# Patient Record
Sex: FEMALE | Race: WHITE | NOT HISPANIC OR LATINO | Employment: OTHER | URBAN - METROPOLITAN AREA
[De-identification: names, ages, dates, MRNs, and addresses within clinical notes are randomized per-mention and may not be internally consistent; named-entity substitution may affect disease eponyms.]

---

## 2019-10-30 ENCOUNTER — APPOINTMENT (OUTPATIENT)
Dept: PREADMISSION TESTING | Facility: HOSPITAL | Age: 74
End: 2019-10-30

## 2019-10-30 VITALS — BODY MASS INDEX: 29.7 KG/M2 | WEIGHT: 161.38 LBS | HEIGHT: 62 IN

## 2019-10-30 LAB
ANION GAP SERPL CALCULATED.3IONS-SCNC: 12 MMOL/L (ref 5–15)
BACTERIA UR QL AUTO: ABNORMAL /HPF
BILIRUB UR QL STRIP: NEGATIVE
BUN BLD-MCNC: 14 MG/DL (ref 8–23)
BUN/CREAT SERPL: 11.5 (ref 7–25)
CALCIUM SPEC-SCNC: 9.4 MG/DL (ref 8.6–10.5)
CEA SERPL-MCNC: 1.66 NG/ML
CHLORIDE SERPL-SCNC: 107 MMOL/L (ref 98–107)
CLARITY UR: ABNORMAL
CO2 SERPL-SCNC: 24 MMOL/L (ref 22–29)
COLOR UR: YELLOW
CREAT BLD-MCNC: 1.22 MG/DL (ref 0.57–1)
DEPRECATED RDW RBC AUTO: 47.8 FL (ref 37–54)
ERYTHROCYTE [DISTWIDTH] IN BLOOD BY AUTOMATED COUNT: 13.3 % (ref 12.3–15.4)
GFR SERPL CREATININE-BSD FRML MDRD: 43 ML/MIN/1.73
GLUCOSE BLD-MCNC: 90 MG/DL (ref 65–99)
GLUCOSE UR STRIP-MCNC: NEGATIVE MG/DL
HBA1C MFR BLD: 5.6 % (ref 4.8–5.6)
HCT VFR BLD AUTO: 35 % (ref 34–46.6)
HGB BLD-MCNC: 10.9 G/DL (ref 12–15.9)
HGB UR QL STRIP.AUTO: ABNORMAL
HYALINE CASTS UR QL AUTO: ABNORMAL /LPF
KETONES UR QL STRIP: NEGATIVE
LEUKOCYTE ESTERASE UR QL STRIP.AUTO: ABNORMAL
MCH RBC QN AUTO: 30.2 PG (ref 26.6–33)
MCHC RBC AUTO-ENTMCNC: 31.1 G/DL (ref 31.5–35.7)
MCV RBC AUTO: 97 FL (ref 79–97)
NITRITE UR QL STRIP: NEGATIVE
PH UR STRIP.AUTO: 5.5 [PH] (ref 5–8)
PLATELET # BLD AUTO: 293 10*3/MM3 (ref 140–450)
PMV BLD AUTO: 10 FL (ref 6–12)
POTASSIUM BLD-SCNC: 4.2 MMOL/L (ref 3.5–5.2)
PROT UR QL STRIP: NEGATIVE
RBC # BLD AUTO: 3.61 10*6/MM3 (ref 3.77–5.28)
RBC # UR: ABNORMAL /HPF
REF LAB TEST METHOD: ABNORMAL
SODIUM BLD-SCNC: 143 MMOL/L (ref 136–145)
SP GR UR STRIP: 1.01 (ref 1–1.03)
SQUAMOUS #/AREA URNS HPF: ABNORMAL /HPF
UROBILINOGEN UR QL STRIP: ABNORMAL
WBC NRBC COR # BLD: 6.54 10*3/MM3 (ref 3.4–10.8)
WBC UR QL AUTO: ABNORMAL /HPF

## 2019-10-30 PROCEDURE — 36415 COLL VENOUS BLD VENIPUNCTURE: CPT

## 2019-10-30 PROCEDURE — 80048 BASIC METABOLIC PNL TOTAL CA: CPT | Performed by: COLON & RECTAL SURGERY

## 2019-10-30 PROCEDURE — 93005 ELECTROCARDIOGRAM TRACING: CPT

## 2019-10-30 PROCEDURE — 85027 COMPLETE CBC AUTOMATED: CPT | Performed by: COLON & RECTAL SURGERY

## 2019-10-30 PROCEDURE — 81001 URINALYSIS AUTO W/SCOPE: CPT | Performed by: COLON & RECTAL SURGERY

## 2019-10-30 PROCEDURE — 87086 URINE CULTURE/COLONY COUNT: CPT | Performed by: COLON & RECTAL SURGERY

## 2019-10-30 PROCEDURE — 83036 HEMOGLOBIN GLYCOSYLATED A1C: CPT | Performed by: COLON & RECTAL SURGERY

## 2019-10-30 PROCEDURE — 82378 CARCINOEMBRYONIC ANTIGEN: CPT | Performed by: COLON & RECTAL SURGERY

## 2019-10-30 PROCEDURE — 93010 ELECTROCARDIOGRAM REPORT: CPT | Performed by: INTERNAL MEDICINE

## 2019-10-30 RX ORDER — GABAPENTIN 300 MG/1
600 CAPSULE ORAL ONCE
Status: SHIPPED | OUTPATIENT
Start: 2019-10-30

## 2019-10-30 RX ORDER — ACETAMINOPHEN 500 MG
1000 TABLET ORAL ONCE
Status: SHIPPED | OUTPATIENT
Start: 2019-10-30

## 2019-10-30 RX ORDER — SCOLOPAMINE TRANSDERMAL SYSTEM 1 MG/1
1 PATCH, EXTENDED RELEASE TRANSDERMAL CONTINUOUS
Status: SHIPPED | OUTPATIENT
Start: 2019-10-30 | End: 2019-11-02

## 2019-10-30 RX ORDER — ALLOPURINOL 300 MG/1
150 TABLET ORAL DAILY
COMMUNITY

## 2019-10-30 RX ORDER — FERROUS SULFATE TAB EC 324 MG (65 MG FE EQUIVALENT) 324 (65 FE) MG
324 TABLET DELAYED RESPONSE ORAL
COMMUNITY
End: 2020-05-28

## 2019-10-30 RX ORDER — SIMVASTATIN 20 MG
20 TABLET ORAL NIGHTLY
COMMUNITY
End: 2022-01-28 | Stop reason: DRUGHIGH

## 2019-10-30 RX ORDER — ASPIRIN 81 MG/1
81 TABLET, CHEWABLE ORAL 2 TIMES DAILY
COMMUNITY
End: 2019-11-09 | Stop reason: HOSPADM

## 2019-10-30 RX ORDER — DOCUSATE SODIUM 100 MG/1
100 CAPSULE, LIQUID FILLED ORAL 2 TIMES DAILY
COMMUNITY

## 2019-10-30 RX ORDER — MELOXICAM 15 MG/1
15 TABLET ORAL ONCE
Status: SHIPPED | OUTPATIENT
Start: 2019-10-30

## 2019-10-31 LAB — BACTERIA SPEC AEROBE CULT: ABNORMAL

## 2019-11-01 NOTE — PAT
Nahomi in Dr. Moore's office notified of Urine culture. Stated patient is going to see PCP today.

## 2019-11-03 ENCOUNTER — ANESTHESIA EVENT (OUTPATIENT)
Dept: PERIOP | Facility: HOSPITAL | Age: 74
End: 2019-11-03

## 2019-11-03 RX ORDER — FAMOTIDINE 10 MG/ML
20 INJECTION, SOLUTION INTRAVENOUS ONCE
Status: CANCELLED | OUTPATIENT
Start: 2019-11-03 | End: 2019-11-03

## 2019-11-04 ENCOUNTER — HOSPITAL ENCOUNTER (INPATIENT)
Facility: HOSPITAL | Age: 74
LOS: 5 days | Discharge: HOME HEALTH | End: 2019-11-09
Attending: COLON & RECTAL SURGERY | Admitting: COLON & RECTAL SURGERY

## 2019-11-04 ENCOUNTER — ANESTHESIA (OUTPATIENT)
Dept: PERIOP | Facility: HOSPITAL | Age: 74
End: 2019-11-04

## 2019-11-04 DIAGNOSIS — K62.3 RECTAL PROLAPSE: ICD-10-CM

## 2019-11-04 DIAGNOSIS — G47.34 NOCTURNAL HYPOXIA: ICD-10-CM

## 2019-11-04 DIAGNOSIS — Z74.09 IMPAIRED FUNCTIONAL MOBILITY, BALANCE, GAIT, AND ENDURANCE: Primary | ICD-10-CM

## 2019-11-04 DIAGNOSIS — Z93.3 S/P COLOSTOMY (HCC): ICD-10-CM

## 2019-11-04 DIAGNOSIS — N81.10 VAGINAL PROLAPSE: ICD-10-CM

## 2019-11-04 LAB — POTASSIUM BLD-SCNC: 3.9 MMOL/L (ref 3.5–5.2)

## 2019-11-04 PROCEDURE — 25010000002 ERTAPENEM 1 GM/100ML SOLUTION: Performed by: COLON & RECTAL SURGERY

## 2019-11-04 PROCEDURE — 63710000001 ACETAMINOPHEN 500 MG TABLET: Performed by: COLON & RECTAL SURGERY

## 2019-11-04 PROCEDURE — 25010000002 BUPRENORPHINE PER 0.1 MG: Performed by: ANESTHESIOLOGY

## 2019-11-04 PROCEDURE — 25010000002 PROPOFOL 10 MG/ML EMULSION: Performed by: NURSE ANESTHETIST, CERTIFIED REGISTERED

## 2019-11-04 PROCEDURE — 84132 ASSAY OF SERUM POTASSIUM: CPT | Performed by: ANESTHESIOLOGY

## 2019-11-04 PROCEDURE — 0USG0ZZ REPOSITION VAGINA, OPEN APPROACH: ICD-10-PCS | Performed by: COLON & RECTAL SURGERY

## 2019-11-04 PROCEDURE — 25010000002 DEXAMETHASONE PER 1 MG: Performed by: NURSE ANESTHETIST, CERTIFIED REGISTERED

## 2019-11-04 PROCEDURE — 0D1B0Z4 BYPASS ILEUM TO CUTANEOUS, OPEN APPROACH: ICD-10-PCS | Performed by: COLON & RECTAL SURGERY

## 2019-11-04 PROCEDURE — 25010000002 HEPARIN (PORCINE) PER 1000 UNITS: Performed by: COLON & RECTAL SURGERY

## 2019-11-04 PROCEDURE — 0DTP0ZZ RESECTION OF RECTUM, OPEN APPROACH: ICD-10-PCS | Performed by: COLON & RECTAL SURGERY

## 2019-11-04 PROCEDURE — 25010000002 DEXAMETHASONE SODIUM PHOSPHATE 10 MG/ML SOLUTION: Performed by: ANESTHESIOLOGY

## 2019-11-04 PROCEDURE — 0WQF0ZZ REPAIR ABDOMINAL WALL, OPEN APPROACH: ICD-10-PCS | Performed by: COLON & RECTAL SURGERY

## 2019-11-04 PROCEDURE — 25010000002 ONDANSETRON PER 1 MG: Performed by: ANESTHESIOLOGY

## 2019-11-04 PROCEDURE — 0UPH7DZ REMOVAL OF INTRALUMINAL DEVICE FROM VAGINA AND CUL-DE-SAC, VIA NATURAL OR ARTIFICIAL OPENING: ICD-10-PCS | Performed by: COLON & RECTAL SURGERY

## 2019-11-04 PROCEDURE — A9270 NON-COVERED ITEM OR SERVICE: HCPCS | Performed by: COLON & RECTAL SURGERY

## 2019-11-04 PROCEDURE — 88307 TISSUE EXAM BY PATHOLOGIST: CPT | Performed by: COLON & RECTAL SURGERY

## 2019-11-04 PROCEDURE — 0DTJ0ZZ RESECTION OF APPENDIX, OPEN APPROACH: ICD-10-PCS | Performed by: COLON & RECTAL SURGERY

## 2019-11-04 DEVICE — CONTOUR CURVED CUTTER STAPLER
Type: IMPLANTABLE DEVICE | Site: SIGMOID COLON | Status: FUNCTIONAL
Brand: CONTOUR

## 2019-11-04 RX ORDER — NALOXONE HCL 0.4 MG/ML
0.4 VIAL (ML) INJECTION
Status: DISCONTINUED | OUTPATIENT
Start: 2019-11-04 | End: 2019-11-09 | Stop reason: HOSPADM

## 2019-11-04 RX ORDER — ALVIMOPAN 12 MG/1
12 CAPSULE ORAL ONCE
Status: COMPLETED | OUTPATIENT
Start: 2019-11-04 | End: 2019-11-04

## 2019-11-04 RX ORDER — DEXAMETHASONE SODIUM PHOSPHATE 4 MG/ML
INJECTION, SOLUTION INTRA-ARTICULAR; INTRALESIONAL; INTRAMUSCULAR; INTRAVENOUS; SOFT TISSUE AS NEEDED
Status: DISCONTINUED | OUTPATIENT
Start: 2019-11-04 | End: 2019-11-04 | Stop reason: SURG

## 2019-11-04 RX ORDER — ROCURONIUM BROMIDE 10 MG/ML
INJECTION, SOLUTION INTRAVENOUS AS NEEDED
Status: DISCONTINUED | OUTPATIENT
Start: 2019-11-04 | End: 2019-11-04 | Stop reason: SURG

## 2019-11-04 RX ORDER — SODIUM CHLORIDE 9 MG/ML
100 INJECTION, SOLUTION INTRAVENOUS ONCE
Status: COMPLETED | OUTPATIENT
Start: 2019-11-04 | End: 2019-11-05

## 2019-11-04 RX ORDER — DEXAMETHASONE SODIUM PHOSPHATE 10 MG/ML
INJECTION, SOLUTION INTRAMUSCULAR; INTRAVENOUS
Status: COMPLETED | OUTPATIENT
Start: 2019-11-04 | End: 2019-11-04

## 2019-11-04 RX ORDER — ONDANSETRON 2 MG/ML
4 INJECTION INTRAMUSCULAR; INTRAVENOUS ONCE
Status: COMPLETED | OUTPATIENT
Start: 2019-11-04 | End: 2019-11-04

## 2019-11-04 RX ORDER — PREGABALIN 75 MG/1
75 CAPSULE ORAL ONCE
Status: COMPLETED | OUTPATIENT
Start: 2019-11-04 | End: 2019-11-04

## 2019-11-04 RX ORDER — SODIUM CHLORIDE 0.9 % (FLUSH) 0.9 %
3 SYRINGE (ML) INJECTION EVERY 12 HOURS SCHEDULED
Status: DISCONTINUED | OUTPATIENT
Start: 2019-11-04 | End: 2019-11-04 | Stop reason: HOSPADM

## 2019-11-04 RX ORDER — PROPOFOL 10 MG/ML
VIAL (ML) INTRAVENOUS AS NEEDED
Status: DISCONTINUED | OUTPATIENT
Start: 2019-11-04 | End: 2019-11-04 | Stop reason: SURG

## 2019-11-04 RX ORDER — MORPHINE SULFATE 2 MG/ML
1 INJECTION, SOLUTION INTRAMUSCULAR; INTRAVENOUS
Status: DISCONTINUED | OUTPATIENT
Start: 2019-11-04 | End: 2019-11-09 | Stop reason: HOSPADM

## 2019-11-04 RX ORDER — MEPERIDINE HYDROCHLORIDE 25 MG/ML
12.5 INJECTION INTRAMUSCULAR; INTRAVENOUS; SUBCUTANEOUS
Status: DISCONTINUED | OUTPATIENT
Start: 2019-11-04 | End: 2019-11-04 | Stop reason: HOSPADM

## 2019-11-04 RX ORDER — MELOXICAM 15 MG/1
15 TABLET ORAL ONCE
Status: COMPLETED | OUTPATIENT
Start: 2019-11-04 | End: 2019-11-04

## 2019-11-04 RX ORDER — MAGNESIUM HYDROXIDE 1200 MG/15ML
LIQUID ORAL AS NEEDED
Status: DISCONTINUED | OUTPATIENT
Start: 2019-11-04 | End: 2019-11-04 | Stop reason: HOSPADM

## 2019-11-04 RX ORDER — ACETAMINOPHEN 500 MG
1000 TABLET ORAL EVERY 8 HOURS
Status: COMPLETED | OUTPATIENT
Start: 2019-11-04 | End: 2019-11-06

## 2019-11-04 RX ORDER — PROMETHAZINE HYDROCHLORIDE 25 MG/ML
6.25 INJECTION, SOLUTION INTRAMUSCULAR; INTRAVENOUS ONCE AS NEEDED
Status: DISCONTINUED | OUTPATIENT
Start: 2019-11-04 | End: 2019-11-04 | Stop reason: HOSPADM

## 2019-11-04 RX ORDER — BUPRENORPHINE HYDROCHLORIDE 0.32 MG/ML
INJECTION INTRAMUSCULAR; INTRAVENOUS
Status: COMPLETED | OUTPATIENT
Start: 2019-11-04 | End: 2019-11-04

## 2019-11-04 RX ORDER — ACETAMINOPHEN 500 MG
1000 TABLET ORAL ONCE
Status: COMPLETED | OUTPATIENT
Start: 2019-11-04 | End: 2019-11-04

## 2019-11-04 RX ORDER — DIAZEPAM 5 MG/1
5 TABLET ORAL EVERY 6 HOURS PRN
Status: DISCONTINUED | OUTPATIENT
Start: 2019-11-04 | End: 2019-11-09 | Stop reason: HOSPADM

## 2019-11-04 RX ORDER — PROMETHAZINE HYDROCHLORIDE 25 MG/1
25 SUPPOSITORY RECTAL ONCE AS NEEDED
Status: DISCONTINUED | OUTPATIENT
Start: 2019-11-04 | End: 2019-11-04 | Stop reason: HOSPADM

## 2019-11-04 RX ORDER — HYDROMORPHONE HYDROCHLORIDE 1 MG/ML
0.5 INJECTION, SOLUTION INTRAMUSCULAR; INTRAVENOUS; SUBCUTANEOUS
Status: DISCONTINUED | OUTPATIENT
Start: 2019-11-04 | End: 2019-11-04 | Stop reason: HOSPADM

## 2019-11-04 RX ORDER — FAMOTIDINE 20 MG/1
20 TABLET, FILM COATED ORAL ONCE
Status: COMPLETED | OUTPATIENT
Start: 2019-11-04 | End: 2019-11-04

## 2019-11-04 RX ORDER — LIDOCAINE HYDROCHLORIDE 10 MG/ML
0.5 INJECTION, SOLUTION EPIDURAL; INFILTRATION; INTRACAUDAL; PERINEURAL ONCE AS NEEDED
Status: COMPLETED | OUTPATIENT
Start: 2019-11-04 | End: 2019-11-04

## 2019-11-04 RX ORDER — PROMETHAZINE HYDROCHLORIDE 25 MG/1
25 TABLET ORAL ONCE AS NEEDED
Status: DISCONTINUED | OUTPATIENT
Start: 2019-11-04 | End: 2019-11-04 | Stop reason: HOSPADM

## 2019-11-04 RX ORDER — OXYCODONE HYDROCHLORIDE 5 MG/1
5 TABLET ORAL EVERY 4 HOURS PRN
Status: DISCONTINUED | OUTPATIENT
Start: 2019-11-04 | End: 2019-11-09 | Stop reason: HOSPADM

## 2019-11-04 RX ORDER — FENTANYL CITRATE 50 UG/ML
50 INJECTION, SOLUTION INTRAMUSCULAR; INTRAVENOUS
Status: DISCONTINUED | OUTPATIENT
Start: 2019-11-04 | End: 2019-11-04 | Stop reason: HOSPADM

## 2019-11-04 RX ORDER — ONDANSETRON 2 MG/ML
4 INJECTION INTRAMUSCULAR; INTRAVENOUS EVERY 6 HOURS PRN
Status: DISCONTINUED | OUTPATIENT
Start: 2019-11-04 | End: 2019-11-09 | Stop reason: HOSPADM

## 2019-11-04 RX ORDER — LIDOCAINE HYDROCHLORIDE 10 MG/ML
INJECTION, SOLUTION EPIDURAL; INFILTRATION; INTRACAUDAL; PERINEURAL AS NEEDED
Status: DISCONTINUED | OUTPATIENT
Start: 2019-11-04 | End: 2019-11-04 | Stop reason: SURG

## 2019-11-04 RX ORDER — HEPARIN SODIUM 5000 [USP'U]/ML
5000 INJECTION, SOLUTION INTRAVENOUS; SUBCUTANEOUS ONCE
Status: COMPLETED | OUTPATIENT
Start: 2019-11-04 | End: 2019-11-04

## 2019-11-04 RX ORDER — BUPIVACAINE HYDROCHLORIDE 2.5 MG/ML
INJECTION, SOLUTION EPIDURAL; INFILTRATION; INTRACAUDAL
Status: COMPLETED | OUTPATIENT
Start: 2019-11-04 | End: 2019-11-04

## 2019-11-04 RX ORDER — SODIUM CHLORIDE 0.9 % (FLUSH) 0.9 %
3-10 SYRINGE (ML) INJECTION AS NEEDED
Status: DISCONTINUED | OUTPATIENT
Start: 2019-11-04 | End: 2019-11-04 | Stop reason: HOSPADM

## 2019-11-04 RX ORDER — SODIUM CHLORIDE, SODIUM LACTATE, POTASSIUM CHLORIDE, CALCIUM CHLORIDE 600; 310; 30; 20 MG/100ML; MG/100ML; MG/100ML; MG/100ML
100 INJECTION, SOLUTION INTRAVENOUS CONTINUOUS
Status: DISCONTINUED | OUTPATIENT
Start: 2019-11-04 | End: 2019-11-07

## 2019-11-04 RX ADMIN — ACETAMINOPHEN 1000 MG: 500 TABLET ORAL at 14:07

## 2019-11-04 RX ADMIN — LIDOCAINE HYDROCHLORIDE 0.3 ML: 10 INJECTION, SOLUTION EPIDURAL; INFILTRATION; INTRACAUDAL; PERINEURAL at 14:07

## 2019-11-04 RX ADMIN — HEPARIN SODIUM 5000 UNITS: 5000 INJECTION INTRAVENOUS; SUBCUTANEOUS at 14:07

## 2019-11-04 RX ADMIN — ROCURONIUM BROMIDE 50 MG: 10 INJECTION INTRAVENOUS at 15:44

## 2019-11-04 RX ADMIN — ERTAPENEM SODIUM 1 G: 1 INJECTION, POWDER, LYOPHILIZED, FOR SOLUTION INTRAMUSCULAR; INTRAVENOUS at 15:40

## 2019-11-04 RX ADMIN — FAMOTIDINE 20 MG: 20 TABLET ORAL at 14:07

## 2019-11-04 RX ADMIN — SODIUM CHLORIDE 100 ML/HR: 9 INJECTION, SOLUTION INTRAVENOUS at 20:04

## 2019-11-04 RX ADMIN — SODIUM CHLORIDE, POTASSIUM CHLORIDE, SODIUM LACTATE AND CALCIUM CHLORIDE 9 ML/HR: 600; 310; 30; 20 INJECTION, SOLUTION INTRAVENOUS at 14:07

## 2019-11-04 RX ADMIN — DEXAMETHASONE SODIUM PHOSPHATE 2 MG: 10 INJECTION INTRAMUSCULAR; INTRAVENOUS at 15:59

## 2019-11-04 RX ADMIN — MELOXICAM 15 MG: 15 TABLET ORAL at 14:07

## 2019-11-04 RX ADMIN — PREGABALIN 75 MG: 75 CAPSULE ORAL at 14:07

## 2019-11-04 RX ADMIN — ACETAMINOPHEN 1000 MG: 500 TABLET, FILM COATED ORAL at 22:31

## 2019-11-04 RX ADMIN — DEXAMETHASONE SODIUM PHOSPHATE 4 MG: 4 INJECTION, SOLUTION INTRAMUSCULAR; INTRAVENOUS at 15:44

## 2019-11-04 RX ADMIN — ALVIMOPAN 12 MG: 12 CAPSULE ORAL at 14:07

## 2019-11-04 RX ADMIN — BUPRENORPHINE HYDROCHLORIDE 0.3 MG: 0.32 INJECTION INTRAMUSCULAR; INTRAVENOUS at 15:59

## 2019-11-04 RX ADMIN — LIDOCAINE HYDROCHLORIDE 50 MG: 10 INJECTION, SOLUTION EPIDURAL; INFILTRATION; INTRACAUDAL; PERINEURAL at 15:44

## 2019-11-04 RX ADMIN — PROPOFOL 25 MCG/KG/MIN: 10 INJECTION, EMULSION INTRAVENOUS at 15:54

## 2019-11-04 RX ADMIN — BUPIVACAINE HYDROCHLORIDE 60 ML: 2.5 INJECTION, SOLUTION EPIDURAL; INFILTRATION; INTRACAUDAL; PERINEURAL at 15:59

## 2019-11-04 RX ADMIN — ONDANSETRON 4 MG: 2 INJECTION INTRAMUSCULAR; INTRAVENOUS at 18:50

## 2019-11-04 RX ADMIN — PROPOFOL 150 MG: 10 INJECTION, EMULSION INTRAVENOUS at 15:44

## 2019-11-04 NOTE — ANESTHESIA PROCEDURE NOTES
Airway  Urgency: elective    Date/Time: 11/4/2019 3:47 PM  Airway not difficult    General Information and Staff    Patient location during procedure: OR  CRNA: Kristel Garvey CRNA    Indications and Patient Condition  Indications for airway management: airway protection    Preoxygenated: yes  MILS not maintained throughout  Mask difficulty assessment: 1 - vent by mask    Final Airway Details  Final airway type: endotracheal airway      Successful airway: ETT  Cuffed: yes   Successful intubation technique: direct laryngoscopy  Facilitating devices/methods: intubating stylet  Endotracheal tube insertion site: oral  Blade: Eli  Blade size: 3  ETT size (mm): 7.0  Cormack-Lehane Classification: grade I - full view of glottis  Placement verified by: chest auscultation and capnometry   Measured from: gums  ETT/EBT to gums (cm): 22  Number of attempts at approach: 1  Assessment: lips, teeth, and gum same as pre-op and atraumatic intubation

## 2019-11-04 NOTE — INTERVAL H&P NOTE
[facility] Pre-op    Full history and physical note from office is up to date.  See office note attached.  Afebrile HR 99 O2 94% Bp 149/82  LAB Results:  Lab Results   Component Value Date    WBC 6.54 10/30/2019    HGB 10.9 (L) 10/30/2019    HCT 35.0 10/30/2019    MCV 97.0 10/30/2019     10/30/2019    GLUCOSE 90 10/30/2019    BUN 14 10/30/2019    CREATININE 1.22 (H) 10/30/2019    EGFRIFNONA 43 (L) 10/30/2019     10/30/2019    K 4.2 10/30/2019     10/30/2019    CO2 24.0 10/30/2019    CALCIUM 9.4 10/30/2019       Cancer Staging (if applicable)  Cancer Patient: __ yes _x_no __unknown__N/A; If yes, clinical stage T:__ N:__M:__, stage group or __N/A    Roseline Alcala, APRN 11/4/2019 1:32 PM

## 2019-11-04 NOTE — ANESTHESIA PREPROCEDURE EVALUATION
Anesthesia Evaluation     Patient summary reviewed and Nursing notes reviewed   NPO Solid Status: > 8 hours  NPO Liquid Status: > 4 hours           Airway   Mallampati: II  TM distance: >3 FB  Neck ROM: full  No difficulty expected  Dental      Pulmonary     breath sounds clear to auscultation  Cardiovascular     ECG reviewed  Rhythm: regular  Rate: normal    (+) hypertension, hyperlipidemia,       Neuro/Psych  GI/Hepatic/Renal/Endo      Musculoskeletal     Abdominal    Substance History      OB/GYN          Other   arthritis,                      Anesthesia Plan    ASA 2     general and general with block   (+ TAP's)  intravenous induction   Anesthetic plan, all risks, benefits, and alternatives have been provided, discussed and informed consent has been obtained with: patient.    Plan discussed with CRNA.

## 2019-11-04 NOTE — NURSING NOTE
WOC nurse consulted for stoma marking. Pt to have permanent colostomy done per Dr. Moore today. Rectus muscle identified, bony prominences, waistband, and creases avoided; marked within patient's visual field in LLQ. Deep horizontal creases marked above umbilicus. Pt has pendulous breasts, which obscure her vision of LUQ. Pt in agreement with marking site. WOC nurse will f/u post-op. Please contact WOC nurse as needed for concerns.

## 2019-11-04 NOTE — ANESTHESIA PROCEDURE NOTES
Peripheral Block      Patient reassessed immediately prior to procedure    Patient location during procedure: OR  Reason for block: at surgeon's request and post-op pain management  Performed by  Anesthesiologist: Rip Rivera MD  Preanesthetic Checklist  Completed: patient identified, site marked, surgical consent, pre-op evaluation, timeout performed, IV checked, risks and benefits discussed and monitors and equipment checked  Prep:  Pt Position: supine  Sterile barriers:cap, gloves, sterile barriers and mask  Prep: ChloraPrep  Patient monitoring: blood pressure monitoring, continuous pulse oximetry and EKG  Procedure  Sedation:yes  Performed under: general  Guidance:ultrasound guided  Images:still images obtained, printed/placed on chart    Laterality:Bilateral  Block Type:TAP  Injection Technique:single-shot  Needle Type:short-bevel and echogenic  Needle Gauge:20 G  Resistance on Injection: none    Medications Used: buprenorphine (BUPRENEX) injection, 0.3 mg  dexamethasone sodium phosphate injection, 2 mg  bupivacaine PF (MARCAINE) 0.25 % injection, 60 mL      Medications  Comment:Block Injection:  LA dose divided between Right and Left block        Post Assessment  Injection Assessment: negative aspiration for heme, incremental injection and no paresthesia on injection  Patient Tolerance:comfortable throughout block  Complications:no  Additional Notes      Under Ultrasound guidance, a BBraun 4inch 360 degree needle was advanced with Normal Saline hydro dissection of tissue.  The Internal Oblique and Transversus Abdominus muscles where visualized.  At or before the aponeurosis of Internal Oblique, local anesthetic spread was visualized in the Transversus Abdominus Plane. Injection was made incrementally with aspiration every 5 mls.  There was no  intravascular injection,  injection pressure was normal, there was no neural injection, and the procedure was completed without difficulty.  Thank You.

## 2019-11-04 NOTE — ANESTHESIA POSTPROCEDURE EVALUATION
Patient: Jessica Durham    Procedure Summary     Date:  11/04/19 Room / Location:   [facility] 04 /  [facility]    Anesthesia Start:  1539 Anesthesia Stop:  1739    Procedure:  PROCTOSIGMOID COLECTOMY, COLOSTOMY, APPENDECTOMY, REMOVAL OF PESSARY, VAGINOPEXY WITH INTRENSIC BRIDGE, UMBILICAL HERNIA REPAIR, AND OMENTUM FLAP (N/A Abdomen) Diagnosis:      Surgeon:  Don Moore MD Provider:  Erasto Lezama MD    Anesthesia Type:  general, general with block ASA Status:  2          Anesthesia Type: general, general with block  Last vitals  BP   137/76   Temp   97.3   Pulse   84   Resp   18   SpO2   100     Post Anesthesia Care and Evaluation    Patient location during evaluation: PACU  Patient participation: complete - patient participated  Level of consciousness: awake and alert  Pain score: 0  Pain management: adequate  Airway patency: patent  Anesthetic complications: No anesthetic complications  PONV Status: none  Cardiovascular status: hemodynamically stable and acceptable  Respiratory status: nonlabored ventilation, acceptable and nasal cannula  Hydration status: acceptable

## 2019-11-04 NOTE — OP NOTE
Colon and Rectal [[facility]]    COLON RESECTION LOW ANTERIOR  Procedure Note    Jessicasummer Mccarthy Durham  11/4/2019    Pre-op Diagnosis:   Rectal and vaginal prolapse with anal incontinence    Post-op Diagnosis:     Same plus diverticulosis and umbilical hernia  Cholelithiasis    Procedure(s):  PROCTOSIGMOID COLECTOMY, COLOSTOMY, APPENDECTOMY, REMOVAL OF PESSARY, VAGINOPEXY WITH INTRINSIC BRIDGE for the PEXY, UMBILICAL HERNIA REPAIR, AND OMENTUM FLAP    Surgeon(s):  Don Moore MD    Anesthesia: General    Staff:   Circulator: Gaviota Giron RN; Heather Cornejo RN  Scrub Person: Puma Cote  Assistant: Isabella Nix PA-C; Chely Rodriguez PA    Estimated Blood Loss:250    Specimens:                  Order Name Source Comment Collection Info Order Time   POTASSIUM    For all patients with renal disease, within 3 days if taking digoxin, potassium-depleting anti-hypertensives, or diuretics. Collected By: Dayana Gayle RN 11/3/2019  8:26 AM   TISSUE PATHOLOGY EXAM Large Intestine, Rectum  Collected By: Don Moore MD 11/4/2019  5:14 PM         Drains:   Urethral Catheter Silicone 16 Fr. (Active)       Findings: Some blood from the irritation from the pessary we removed from the vagina.  Absent uterus and ovaries.  Contracted gallbladder with stones.  No inflammation.  Thickened sigmoid no inflammation.  Diverticulosis.  Low-lying rectum and vagina.  Retrocecal appendix.    Technique: The patient was taken to the operating room placed under general anesthesia and given a tap block.  Her pessary was then removed.  Her ostomy site was scored.  Her abdomen prepped and draped appropriately.  Elliptical incision was made about her umbilicus which I was removed with a permanent ostomy.  Hers had about a 15 mm hernia in it.  The incision was carried down to the pubis.  Considerable adipose tissue was dealt with.  Small bowel was run and was normal.  As mentioned the gallbladder was contracted with multiple  stones.    The left colon was mobilized along the line of Toldt and the ureter swept out of harm's way.  The presacral space was entered and dissected all the way to the levators.  The vagina was found in dissected off the rectum as well down to the levators as well.  Attachments of the rectum were cauterized.  A green contour stapler was used to divide the rectum at the levators.  Hemostasis was achieved in the pelvis.  The omentum was taken off the transverse colon and it was generous and completely filled her pelvis.  I mobilized the vagina further and would not reach the sacral promontory so I dissected a 2 inch piece of vagina off the front and developed old flap that was based at its apex and this fit nicely to the sacral promontory and I secured the vagina to the promontory with a #1 Prolene.    The cecum was mobilized and the tip of the appendix grabbed mobilized and then I divided the mesoappendix and ligated that and then put a 0 Vicryl across the base the appendix which is amputated the mucosal but cauterized.    A donut of skin was removed from the ostomy site and sharp and blunt dissection was used to penetrate through the rectus muscle and fascia and dilated to 2 fingers.  I divided the rectum off the specimen and brought the sigmoid colon back through the ostomy site.  Impressive amounts of adipose tissue still surrounded the entire left colon.  I would have had to go on all the way to the transverse to get larger less fatty bowel.  I stuck with the left.        The abdomen was irrigated with saline made hemostatic and GI contents placed back anatomically.  The omentum was nicely tucked behind the vaginal proxy.  The fascia was closed in a single layer with a #1 looped PDS.  Subcutaneous tissue irrigated with saline skin closed with a running #1 Prolene.    Considerable time was spent removing adipose tissue from the ostomy so that I could matured with interrupted 3-0 Vicryl in a typical [facility]  fashion.  An appliance was placed.  The patient tolerated the procedure well was taken to the recovery room in satisfactory condition.    Complications: 0      Don Moore MD     Date: 11/4/2019  Time: 5:39 PM

## 2019-11-05 PROBLEM — N18.30 ACUTE RENAL FAILURE SUPERIMPOSED ON STAGE 3 CHRONIC KIDNEY DISEASE (HCC): Status: ACTIVE | Noted: 2019-11-05

## 2019-11-05 PROBLEM — I10 ESSENTIAL HYPERTENSION: Status: ACTIVE | Noted: 2019-11-05

## 2019-11-05 PROBLEM — K21.9 GERD (GASTROESOPHAGEAL REFLUX DISEASE): Status: ACTIVE | Noted: 2019-11-05

## 2019-11-05 PROBLEM — D64.9 ANEMIA: Status: ACTIVE | Noted: 2019-11-05

## 2019-11-05 PROBLEM — E78.5 HYPERLIPIDEMIA: Status: ACTIVE | Noted: 2019-11-05

## 2019-11-05 PROBLEM — N81.10 VAGINAL PROLAPSE: Status: ACTIVE | Noted: 2019-11-05

## 2019-11-05 PROBLEM — N17.9 ACUTE RENAL FAILURE SUPERIMPOSED ON STAGE 3 CHRONIC KIDNEY DISEASE: Status: ACTIVE | Noted: 2019-11-05

## 2019-11-05 PROBLEM — Z93.3 S/P COLOSTOMY: Status: ACTIVE | Noted: 2019-11-05

## 2019-11-05 PROBLEM — Z90.49 S/P PARTIAL COLECTOMY: Status: ACTIVE | Noted: 2019-11-05

## 2019-11-05 PROBLEM — N18.30 CKD (CHRONIC KIDNEY DISEASE), STAGE III (HCC): Status: ACTIVE | Noted: 2019-11-05

## 2019-11-05 LAB
ANION GAP SERPL CALCULATED.3IONS-SCNC: 14 MMOL/L (ref 5–15)
ANION GAP SERPL CALCULATED.3IONS-SCNC: 14 MMOL/L (ref 5–15)
BASOPHILS # BLD AUTO: 0.01 10*3/MM3 (ref 0–0.2)
BASOPHILS NFR BLD AUTO: 0.1 % (ref 0–1.5)
BUN BLD-MCNC: 22 MG/DL (ref 8–23)
BUN BLD-MCNC: 23 MG/DL (ref 8–23)
BUN/CREAT SERPL: 9.6 (ref 7–25)
BUN/CREAT SERPL: 9.7 (ref 7–25)
CALCIUM SPEC-SCNC: 8.2 MG/DL (ref 8.6–10.5)
CALCIUM SPEC-SCNC: 8.7 MG/DL (ref 8.6–10.5)
CHLORIDE SERPL-SCNC: 100 MMOL/L (ref 98–107)
CHLORIDE SERPL-SCNC: 103 MMOL/L (ref 98–107)
CO2 SERPL-SCNC: 17 MMOL/L (ref 22–29)
CO2 SERPL-SCNC: 20 MMOL/L (ref 22–29)
CREAT BLD-MCNC: 2.28 MG/DL (ref 0.57–1)
CREAT BLD-MCNC: 2.37 MG/DL (ref 0.57–1)
DEPRECATED RDW RBC AUTO: 48.4 FL (ref 37–54)
EOSINOPHIL # BLD AUTO: 0 10*3/MM3 (ref 0–0.4)
EOSINOPHIL NFR BLD AUTO: 0 % (ref 0.3–6.2)
ERYTHROCYTE [DISTWIDTH] IN BLOOD BY AUTOMATED COUNT: 13.5 % (ref 12.3–15.4)
GFR SERPL CREATININE-BSD FRML MDRD: 20 ML/MIN/1.73
GFR SERPL CREATININE-BSD FRML MDRD: 21 ML/MIN/1.73
GLUCOSE BLD-MCNC: 150 MG/DL (ref 65–99)
GLUCOSE BLD-MCNC: 152 MG/DL (ref 65–99)
HCT VFR BLD AUTO: 30.2 % (ref 34–46.6)
HGB BLD-MCNC: 9.2 G/DL (ref 12–15.9)
IMM GRANULOCYTES # BLD AUTO: 0.06 10*3/MM3 (ref 0–0.05)
IMM GRANULOCYTES NFR BLD AUTO: 0.4 % (ref 0–0.5)
LYMPHOCYTES # BLD AUTO: 1.19 10*3/MM3 (ref 0.7–3.1)
LYMPHOCYTES NFR BLD AUTO: 8.1 % (ref 19.6–45.3)
MAGNESIUM SERPL-MCNC: 1.9 MG/DL (ref 1.6–2.4)
MCH RBC QN AUTO: 30.2 PG (ref 26.6–33)
MCHC RBC AUTO-ENTMCNC: 30.5 G/DL (ref 31.5–35.7)
MCV RBC AUTO: 99 FL (ref 79–97)
MONOCYTES # BLD AUTO: 1.04 10*3/MM3 (ref 0.1–0.9)
MONOCYTES NFR BLD AUTO: 7.1 % (ref 5–12)
NEUTROPHILS # BLD AUTO: 12.43 10*3/MM3 (ref 1.7–7)
NEUTROPHILS NFR BLD AUTO: 84.3 % (ref 42.7–76)
NRBC BLD AUTO-RTO: 0 /100 WBC (ref 0–0.2)
PHOSPHATE SERPL-MCNC: 5.9 MG/DL (ref 2.5–4.5)
PLATELET # BLD AUTO: 286 10*3/MM3 (ref 140–450)
PMV BLD AUTO: 10.5 FL (ref 6–12)
POTASSIUM BLD-SCNC: 4.7 MMOL/L (ref 3.5–5.2)
POTASSIUM BLD-SCNC: 4.8 MMOL/L (ref 3.5–5.2)
RBC # BLD AUTO: 3.05 10*6/MM3 (ref 3.77–5.28)
SODIUM BLD-SCNC: 134 MMOL/L (ref 136–145)
SODIUM BLD-SCNC: 134 MMOL/L (ref 136–145)
WBC NRBC COR # BLD: 14.73 10*3/MM3 (ref 3.4–10.8)

## 2019-11-05 PROCEDURE — A9270 NON-COVERED ITEM OR SERVICE: HCPCS | Performed by: COLON & RECTAL SURGERY

## 2019-11-05 PROCEDURE — 80048 BASIC METABOLIC PNL TOTAL CA: CPT | Performed by: COLON & RECTAL SURGERY

## 2019-11-05 PROCEDURE — 86901 BLOOD TYPING SEROLOGIC RH(D): CPT

## 2019-11-05 PROCEDURE — 80048 BASIC METABOLIC PNL TOTAL CA: CPT | Performed by: PHYSICIAN ASSISTANT

## 2019-11-05 PROCEDURE — 85025 COMPLETE CBC W/AUTO DIFF WBC: CPT | Performed by: COLON & RECTAL SURGERY

## 2019-11-05 PROCEDURE — 84100 ASSAY OF PHOSPHORUS: CPT | Performed by: COLON & RECTAL SURGERY

## 2019-11-05 PROCEDURE — 86900 BLOOD TYPING SEROLOGIC ABO: CPT

## 2019-11-05 PROCEDURE — 25010000003 AMPICILLIN-SULBACTAM PER 1.5 G: Performed by: PHYSICIAN ASSISTANT

## 2019-11-05 PROCEDURE — 63710000001 OXYCODONE 5 MG TABLET: Performed by: COLON & RECTAL SURGERY

## 2019-11-05 PROCEDURE — 63710000001 ACETAMINOPHEN 500 MG TABLET: Performed by: COLON & RECTAL SURGERY

## 2019-11-05 PROCEDURE — 99222 1ST HOSP IP/OBS MODERATE 55: CPT | Performed by: PHYSICIAN ASSISTANT

## 2019-11-05 PROCEDURE — 25010000002 ENOXAPARIN PER 10 MG: Performed by: COLON & RECTAL SURGERY

## 2019-11-05 PROCEDURE — 83735 ASSAY OF MAGNESIUM: CPT | Performed by: COLON & RECTAL SURGERY

## 2019-11-05 RX ORDER — PANTOPRAZOLE SODIUM 40 MG/1
40 TABLET, DELAYED RELEASE ORAL EVERY MORNING
Status: DISCONTINUED | OUTPATIENT
Start: 2019-11-06 | End: 2019-11-09 | Stop reason: HOSPADM

## 2019-11-05 RX ORDER — CETIRIZINE HYDROCHLORIDE 10 MG/1
10 TABLET ORAL NIGHTLY
Status: DISCONTINUED | OUTPATIENT
Start: 2019-11-05 | End: 2019-11-09 | Stop reason: HOSPADM

## 2019-11-05 RX ORDER — ATORVASTATIN CALCIUM 20 MG/1
20 TABLET, FILM COATED ORAL NIGHTLY
Status: DISCONTINUED | OUTPATIENT
Start: 2019-11-05 | End: 2019-11-05

## 2019-11-05 RX ORDER — NYSTATIN 100000 [USP'U]/G
POWDER TOPICAL EVERY 12 HOURS SCHEDULED
Status: DISCONTINUED | OUTPATIENT
Start: 2019-11-05 | End: 2019-11-09 | Stop reason: HOSPADM

## 2019-11-05 RX ORDER — FERROUS SULFATE 325(65) MG
325 TABLET ORAL
Status: DISCONTINUED | OUTPATIENT
Start: 2019-11-06 | End: 2019-11-05

## 2019-11-05 RX ADMIN — SODIUM CHLORIDE 1000 ML: 9 INJECTION, SOLUTION INTRAVENOUS at 09:27

## 2019-11-05 RX ADMIN — ATORVASTATIN CALCIUM 20 MG: 20 TABLET, FILM COATED ORAL at 20:13

## 2019-11-05 RX ADMIN — ENOXAPARIN SODIUM 40 MG: 40 INJECTION SUBCUTANEOUS at 09:26

## 2019-11-05 RX ADMIN — AMPICILLIN SODIUM AND SULBACTAM SODIUM 1.5 G: 1; .5 INJECTION, POWDER, FOR SOLUTION INTRAMUSCULAR; INTRAVENOUS at 18:18

## 2019-11-05 RX ADMIN — ACETAMINOPHEN 1000 MG: 500 TABLET, FILM COATED ORAL at 15:31

## 2019-11-05 RX ADMIN — ACETAMINOPHEN 1000 MG: 500 TABLET, FILM COATED ORAL at 05:17

## 2019-11-05 RX ADMIN — SODIUM CHLORIDE, POTASSIUM CHLORIDE, SODIUM LACTATE AND CALCIUM CHLORIDE 100 ML/HR: 600; 310; 30; 20 INJECTION, SOLUTION INTRAVENOUS at 13:16

## 2019-11-05 RX ADMIN — ACETAMINOPHEN 1000 MG: 500 TABLET, FILM COATED ORAL at 20:13

## 2019-11-05 RX ADMIN — OXYCODONE HYDROCHLORIDE 5 MG: 5 TABLET ORAL at 05:17

## 2019-11-05 RX ADMIN — CETIRIZINE HYDROCHLORIDE 10 MG: 10 TABLET, FILM COATED ORAL at 20:13

## 2019-11-05 RX ADMIN — SODIUM CHLORIDE 1000 ML: 9 INJECTION, SOLUTION INTRAVENOUS at 20:23

## 2019-11-05 RX ADMIN — OXYCODONE HYDROCHLORIDE 5 MG: 5 TABLET ORAL at 11:53

## 2019-11-05 NOTE — PLAN OF CARE
Problem: Patient Care Overview  Goal: Plan of Care Review  Outcome: Ongoing (interventions implemented as appropriate)   11/05/19 0948   Coping/Psychosocial   Plan of Care Reviewed With patient;other (see comments)  (Fely RN)   Plan of Care Review   Progress no change   OTHER   Outcome Summary WOC nurse consulted for new colostomy done per Dr. Moore 11/4/19 for rectal and vaginal prolapse with anal incontinence. Stoma red and moist; protruding above skin level. Small amount dark red/brown liquid stool in pouch. Appliance intact with no leakage. Education folder given to pt with brief education started. Pt slightly drowsy at this time. WOC nurse will f/u. Please contact WOC nurse as needed for concerns.

## 2019-11-05 NOTE — PROGRESS NOTES
Discharge Planning Assessment  [facility]     Patient Name: Jessica Durham  MRN: [id number]  Today's Date: 11/5/2019    Admit Date: 11/4/2019    Discharge Needs Assessment     Row Name 11/05/19 1152       Living Environment    Lives With  spouse    Current Living Arrangements  home/apartment/condo    Living Arrangement Comments  Has steps but not an issue. Can stay on one level if needed.       Discharge Needs Assessment    Equipment Currently Used at Home  none    Equipment Needed After Discharge  none    Discharge Coordination/Progress  No HH, DME or outpt services involved in care currently. Does have a [employer] nurse who checks on her.        Discharge Plan     Row Name 11/05/19 1154       Plan    Plan  Home at DC    Patient/Family in Agreement with Plan  yes    Plan Comments  I spoke with the pt. She has no DC needs at this time.    Final Discharge Disposition Code  01 - home or self-care        Destination      No service coordination in this encounter.      Durable Medical Equipment      No service coordination in this encounter.      Dialysis/Infusion      No service coordination in this encounter.      Home Medical Care      No service coordination in this encounter.      Therapy      No service coordination in this encounter.      Community Resources      No service coordination in this encounter.          Demographic Summary    No documentation.       Functional Status     Row Name 11/05/19 1152       Functional Status    Usual Activity Tolerance  good       Functional Status, IADL    Medications  independent    Meal Preparation  independent    Housekeeping  independent    Laundry  independent    Shopping  independent        Psychosocial    No documentation.       Abuse/Neglect    No documentation.       Legal    No documentation.       Substance Abuse    No documentation.       Patient Forms    No documentation.           Kellee Aleman RN

## 2019-11-05 NOTE — PLAN OF CARE
Problem: Fall Risk (Adult)  Goal: Absence of Fall  Outcome: Ongoing (interventions implemented as appropriate)   11/05/19 0436   Fall Risk (Adult)   Absence of Fall making progress toward outcome

## 2019-11-05 NOTE — CONSULTS
[facility] Medicine Services  CONSULT NOTE      Patient Name: Jessica Durham  : 1945  MRN: [id number]    Primary Care Physician: Isabella Munroe MD  Provider requesting consultation: Don Moore MD    Subjective     Reason for Consultation: postop medical management     HPI:  Jessica Durham is a 74 y.o. female with PMH significant for HTN, HLD, CKD III, anemia of chronic disease, GERD, gout and arthritis. She has been followed by Dr. Moore for rectal and vaginal prolapse with mara incontinence. She was admitted to [facility] on 2019 for elective proctosigmoid colectomy, colostomy, appendectomy, vaginopexy with intrinsic bridge, umbilical hernia repair and omentum flap by Dr. Moore. She tolerated the procedure well. Hospital medicine consulted postoperatively for medical management.     Creatinine on POD #1 elevated to 2.28. Discussed with RN who reported that urinary output was poor overnight and still poor thus far today. Met with patient in room, she reported that her pain is controlled. Tolerating PO intake so far with no nausea or vomiting. No issues with colostomy. She reports that she follows with nephrologist, Dr. Chance Mcmahon and creatinine is usually ~1.4 or below. No other issues or complaints.     Review of Systems  Gen- No fevers, chills  CV- No chest pain, palpitations  Resp- No cough, dyspnea  GI- No N/V/D, (+) postsurgical abdominal pain     Otherwise complete ROS reviewed and is negative except as mentioned in the HPI.    Past Medical History:   Diagnosis Date   • Arthritis    • Gout    • Hyperlipidemia    • Hypertension    • Rectal prolapse    • Rectocele      Past Surgical History:   Procedure Laterality Date   • BLADDER REPAIR     • COLON RESECTION N/A 2019    Procedure: PROCTOSIGMOID COLECTOMY, COLOSTOMY, APPENDECTOMY, REMOVAL OF PESSARY, VAGINOPEXY WITH INTRENSIC BRIDGE, UMBILICAL HERNIA REPAIR, AND OMENTUM FLAP;   Surgeon: Don Moore MD;  Location: [facility];  Service: General   • COLONOSCOPY     • EYE SURGERY      bilateral lens     Family History: family history is not on file. Otherwise pertinent FHx was reviewed and unremarkable.     Social History:  reports that she has quit smoking. She has never used smokeless tobacco. She reports that she does not drink alcohol or use drugs.    Medications:  Medications Prior to Admission   Medication Sig Dispense Refill Last Dose   • Cetirizine HCl (ZYRTEC ALLERGY) 10 MG capsule Take 10 mg by mouth Every Evening.   11/3/2019 at 2200   • esomeprazole (nexIUM) 20 MG capsule Take 20 mg by mouth Every Morning Before Breakfast.   11/3/2019 at 2200   • simvastatin (ZOCOR) 20 MG tablet Take 20 mg by mouth Every Night.   11/3/2019 at 2200   • allopurinol (ZYLOPRIM) 300 MG tablet Take 150 mg by mouth Daily.   11/2/2019   • aspirin 81 MG chewable tablet Chew 81 mg 2 (Two) Times a Day.   11/2/2019   • docusate sodium (COLACE) 100 MG capsule Take 200 mg by mouth 2 (Two) Times a Day.   11/2/2019   • ferrous sulfate 324 (65 Fe) MG tablet delayed-release EC tablet Take 324 mg by mouth Daily With Breakfast.   11/2/2019   • lisinopril 10 MG tablet 10 mg, hydrochlorothiazide 12.5 MG 12.5 mg Take 1 dose by mouth Daily.   11/2/2019   • Multiple Vitamins-Minerals (CENTRUM SILVER ADULT 50+ PO) Take 1 tablet by mouth Daily.   11/2/2019     Scheduled Meds:  acetaminophen 1,000 mg Oral Q8H   enoxaparin 40 mg Subcutaneous Daily     Continuous Infusions:    lactated ringers 100 mL/hr Last Rate: 100 mL/hr (11/05/19 1326)     PRN Meds:  diazePAM  •  Morphine **AND** naloxone  •  ondansetron  •  oxyCODONE    No Known Allergies     Objective     Vital Signs:   Temp:  [96.8 °F (36 °C)-98.3 °F (36.8 °C)] 97.8 °F (36.6 °C)  Heart Rate:  [] 91  Resp:  [14-20] 18  BP: ()/(57-82) 106/60     Physical Exam  Constitutional: No acute distress, awake, alert and conversant. Laying in bed.   HENT: NCAT,  mucous membranes moist  Respiratory: Clear to auscultation bilaterally, respiratory effort normal   Cardiovascular: RRR, no murmurs, rubs, or gallops, palpable pedal pulses bilaterally  Gastrointestinal: Positive bowel sounds, soft, appropriately tender. Abdominal incision dressed, not removed for exam. Blood/stool in ostomy bag.   Musculoskeletal: No bilateral ankle edema  Psychiatric: Appropriate affect, cooperative  Neurologic: Oriented x 3, strength symmetric in all extremities, Cranial Nerves grossly intact to confrontation, speech clear  Skin: No rashes    Results Reviewed:  I have personally reviewed current lab, radiology, and data and agree.    Results from last 7 days   Lab Units 11/05/19  0915   WBC 10*3/mm3 14.73*   HEMOGLOBIN g/dL 9.2*   HEMATOCRIT % 30.2*   PLATELETS 10*3/mm3 286     Results from last 7 days   Lab Units 11/05/19  0915   SODIUM mmol/L 134*   POTASSIUM mmol/L 4.8   CHLORIDE mmol/L 100   CO2 mmol/L 20.0*   BUN mg/dL 22   CREATININE mg/dL 2.28*   GLUCOSE mg/dL 150*   CALCIUM mg/dL 8.7     Estimated Creatinine Clearance: 19.7 mL/min (A) (by C-G formula based on SCr of 2.28 mg/dL (H)).  Brief Urine Lab Results  (Last result in the past 365 days)      Color   Clarity   Blood   Leuk Est   Nitrite   Protein   CREAT   Urine HCG        10/30/19 1230 Yellow Cloudy Trace Large (3+) Negative Negative             No results found for: BNP    Microbiology Results Abnormal     None        Imaging Results (Last 24 Hours)     ** No results found for the last 24 hours. **        Assessment / Plan     Active Hospital Problems    Diagnosis POA   • **S/P partial colectomy [Z90.49] Not Applicable   • Essential hypertension [I10] Yes   • Hyperlipidemia [E78.5] Yes   • GERD (gastroesophageal reflux disease) [K21.9] Yes   • Anemia [D64.9] Yes   • Vaginal prolapse [N81.10] Yes   • S/P colostomy (CMS/[facility]) [Z93.3] Not Applicable   • Acute renal failure superimposed on stage 3 chronic kidney disease (CMS/HCC)  [N17.9, N18.3] Clinically Undetermined   • Rectal prolapse [K62.3] Yes     Jessica Durham is a 74 y.o. female with PMH significant for HTN, HLD, CKD III, anemia of chronic disease, GERD, gout and arthritis. She has been followed by Dr. Moore for rectal and vaginal prolapse with mara incontinence. She was admitted to [facility] on 11/4/2019 for elective proctosigmoid colectomy, colostomy, appendectomy, vaginopexy with intrinsic bridge, umbilical hernia repair and omentum flap by Dr. Moore. She tolerated the procedure well. Hospital medicine consulted postoperatively for medical management.     Rectal and vaginal prolapse   - s/p proctosigmoid colectomy, colostomy, appendectomy, vaginopexy with intrinsic bridge, umbilical hernia repair and omentum flap by Dr. Moore 11/4/2019  - Postoperative care per CRS team   - Continue sinclair catheter to monitor urine output  - Advance diet pet surgeon - current full liquid diet. GI soft soon?    SHAMIR on CKD III  - Baseline creatinine 1.4 - currently 2.37  - IV fluids at 125mL/hr and repeat BMP in AM   - Unclear if patient took Lisinopril/HCTZ on day of surgery - patient thinks she may have     Leukocytosis  - Likely post-reactive  - Urine culture (+) lactobacillus species, unclear if this is pathogenic but will treat for now with ampicillin     Anemia  - Chronic but acutely worse postoperatively. Continue to monitor   - Continue home PO iron supplement  - AM CBC     Essential hypertension  - Holding home BP meds due to low BPs and SHAMIR    Hyperlipidemia  - Continue home statin     GERD  - Continue home meds. Stable.     Thank you for allowing [facility] Medicine Service to provide consultative care for your patient, we will continue to follow while clinically appropriate.    Electronically signed by Diamond Hadley PA-C, 11/05/19, 12:52 PM.

## 2019-11-06 LAB
ABO GROUP BLD: NORMAL
ABO GROUP BLD: NORMAL
ANION GAP SERPL CALCULATED.3IONS-SCNC: 10 MMOL/L (ref 5–15)
ANTI-D: NORMAL
BLD GP AB SCN SERPL QL: POSITIVE
BUN BLD-MCNC: 24 MG/DL (ref 8–23)
BUN/CREAT SERPL: 10.8 (ref 7–25)
CALCIUM SPEC-SCNC: 7.8 MG/DL (ref 8.6–10.5)
CHLORIDE SERPL-SCNC: 105 MMOL/L (ref 98–107)
CO2 SERPL-SCNC: 19 MMOL/L (ref 22–29)
CREAT BLD-MCNC: 2.23 MG/DL (ref 0.57–1)
DEPRECATED RDW RBC AUTO: 48.6 FL (ref 37–54)
ERYTHROCYTE [DISTWIDTH] IN BLOOD BY AUTOMATED COUNT: 13.6 % (ref 12.3–15.4)
GFR SERPL CREATININE-BSD FRML MDRD: 21 ML/MIN/1.73
GLUCOSE BLD-MCNC: 85 MG/DL (ref 65–99)
HCT VFR BLD AUTO: 19.5 % (ref 34–46.6)
HCT VFR BLD AUTO: 20.5 % (ref 34–46.6)
HCT VFR BLD AUTO: 28.5 % (ref 34–46.6)
HGB BLD-MCNC: 6.1 G/DL (ref 12–15.9)
HGB BLD-MCNC: 6.2 G/DL (ref 12–15.9)
HGB BLD-MCNC: 9.2 G/DL (ref 12–15.9)
MCH RBC QN AUTO: 30.8 PG (ref 26.6–33)
MCHC RBC AUTO-ENTMCNC: 31.3 G/DL (ref 31.5–35.7)
MCV RBC AUTO: 98.5 FL (ref 79–97)
PLATELET # BLD AUTO: 189 10*3/MM3 (ref 140–450)
PMV BLD AUTO: 11 FL (ref 6–12)
POTASSIUM BLD-SCNC: 4.6 MMOL/L (ref 3.5–5.2)
RBC # BLD AUTO: 1.98 10*6/MM3 (ref 3.77–5.28)
RH BLD: NEGATIVE
RH BLD: NEGATIVE
SODIUM BLD-SCNC: 134 MMOL/L (ref 136–145)
T&S EXPIRATION DATE: NORMAL
WBC NRBC COR # BLD: 11.05 10*3/MM3 (ref 3.4–10.8)

## 2019-11-06 PROCEDURE — 80048 BASIC METABOLIC PNL TOTAL CA: CPT | Performed by: PHYSICIAN ASSISTANT

## 2019-11-06 PROCEDURE — 86900 BLOOD TYPING SEROLOGIC ABO: CPT | Performed by: PHYSICIAN ASSISTANT

## 2019-11-06 PROCEDURE — 86922 COMPATIBILITY TEST ANTIGLOB: CPT

## 2019-11-06 PROCEDURE — 86870 RBC ANTIBODY IDENTIFICATION: CPT | Performed by: PHYSICIAN ASSISTANT

## 2019-11-06 PROCEDURE — 94799 UNLISTED PULMONARY SVC/PX: CPT

## 2019-11-06 PROCEDURE — P9016 RBC LEUKOCYTES REDUCED: HCPCS

## 2019-11-06 PROCEDURE — 85027 COMPLETE CBC AUTOMATED: CPT | Performed by: PHYSICIAN ASSISTANT

## 2019-11-06 PROCEDURE — 25010000003 AMPICILLIN-SULBACTAM PER 1.5 G: Performed by: PHYSICIAN ASSISTANT

## 2019-11-06 PROCEDURE — 85014 HEMATOCRIT: CPT | Performed by: PHYSICIAN ASSISTANT

## 2019-11-06 PROCEDURE — 86901 BLOOD TYPING SEROLOGIC RH(D): CPT | Performed by: PHYSICIAN ASSISTANT

## 2019-11-06 PROCEDURE — 86850 RBC ANTIBODY SCREEN: CPT | Performed by: PHYSICIAN ASSISTANT

## 2019-11-06 PROCEDURE — 99233 SBSQ HOSP IP/OBS HIGH 50: CPT | Performed by: PHYSICIAN ASSISTANT

## 2019-11-06 PROCEDURE — 86900 BLOOD TYPING SEROLOGIC ABO: CPT

## 2019-11-06 PROCEDURE — 85018 HEMOGLOBIN: CPT | Performed by: PHYSICIAN ASSISTANT

## 2019-11-06 PROCEDURE — 86920 COMPATIBILITY TEST SPIN: CPT

## 2019-11-06 PROCEDURE — 36430 TRANSFUSION BLD/BLD COMPNT: CPT

## 2019-11-06 PROCEDURE — 86850 RBC ANTIBODY SCREEN: CPT

## 2019-11-06 RX ADMIN — NYSTATIN: 100000 POWDER TOPICAL at 16:49

## 2019-11-06 RX ADMIN — ACETAMINOPHEN 1000 MG: 500 TABLET, FILM COATED ORAL at 13:47

## 2019-11-06 RX ADMIN — AMPICILLIN SODIUM AND SULBACTAM SODIUM 1.5 G: 1; .5 INJECTION, POWDER, FOR SOLUTION INTRAMUSCULAR; INTRAVENOUS at 22:29

## 2019-11-06 RX ADMIN — NYSTATIN: 100000 POWDER TOPICAL at 22:37

## 2019-11-06 RX ADMIN — PANTOPRAZOLE SODIUM 40 MG: 40 TABLET, DELAYED RELEASE ORAL at 09:19

## 2019-11-06 RX ADMIN — ACETAMINOPHEN 1000 MG: 500 TABLET, FILM COATED ORAL at 05:17

## 2019-11-06 RX ADMIN — AMPICILLIN SODIUM AND SULBACTAM SODIUM 1.5 G: 1; .5 INJECTION, POWDER, FOR SOLUTION INTRAMUSCULAR; INTRAVENOUS at 05:17

## 2019-11-06 RX ADMIN — CETIRIZINE HYDROCHLORIDE 10 MG: 10 TABLET, FILM COATED ORAL at 22:29

## 2019-11-06 NOTE — PLAN OF CARE
Problem: Patient Care Overview  Goal: Plan of Care Review  Outcome: Ongoing (interventions implemented as appropriate)   11/06/19 3627   Coping/Psychosocial   Plan of Care Reviewed With patient;other (see comments)  (Lorene METCALF)   Plan of Care Review   Progress improving   OTHER   Outcome Summary WOC nurse f/u for colostomy. Appliance intact with no leakage. Stoma red and moist. 50mL dark brown/dark red liquid output emptied from pouch. Extensive education performed with pt, including diet, fluids, protein recommendations, activity, bathing, travel; pt emptied pouch. WOC nurse will f/u. Please contact WOC nurse as needed for concerns.

## 2019-11-06 NOTE — PROGRESS NOTES
"Colon and Rectal [CSGA]    POD # 1    /57 (BP Location: Right arm, Patient Position: Lying)   Pulse 110   Temp 99 °F (37.2 °C) (Oral)   Resp 18   Ht 157.5 cm (62.01\")   Wt 71.4 kg (157 lb 6.4 oz)   SpO2 98%   BMI 28.78 kg/m²     Lab Results (last 24 hours)     Procedure Component Value Units Date/Time    Basic Metabolic Panel [[id number]]  (Abnormal) Collected:  11/05/19 1351    Specimen:  Blood Updated:  11/05/19 1448     Glucose 152 mg/dL      BUN 23 mg/dL      Creatinine 2.37 mg/dL      Sodium 134 mmol/L      Potassium 4.7 mmol/L      Chloride 103 mmol/L      CO2 17.0 mmol/L      Calcium 8.2 mg/dL      eGFR Non African Amer 20 mL/min/1.73      BUN/Creatinine Ratio 9.7     Anion Gap 14.0 mmol/L     Narrative:       GFR Normal >60  Chronic Kidney Disease <60  Kidney Failure <15    Basic Metabolic Panel [[id number]]  (Abnormal) Collected:  11/05/19 0915    Specimen:  Blood Updated:  11/05/19 1027     Glucose 150 mg/dL      BUN 22 mg/dL      Creatinine 2.28 mg/dL      Sodium 134 mmol/L      Potassium 4.8 mmol/L      Chloride 100 mmol/L      CO2 20.0 mmol/L      Calcium 8.7 mg/dL      eGFR Non African Amer 21 mL/min/1.73      BUN/Creatinine Ratio 9.6     Anion Gap 14.0 mmol/L     Narrative:       GFR Normal >60  Chronic Kidney Disease <60  Kidney Failure <15    Magnesium [[id number]]  (Normal) Collected:  11/05/19 0915    Specimen:  Blood Updated:  11/05/19 1026     Magnesium 1.9 mg/dL     Phosphorus [[id number]]  (Abnormal) Collected:  11/05/19 0915    Specimen:  Blood Updated:  11/05/19 1026     Phosphorus 5.9 mg/dL     CBC & Differential [[id number]] Collected:  11/05/19 0915    Specimen:  Blood Updated:  11/05/19 0947    Narrative:       The following orders were created for panel order CBC & Differential.  Procedure                               Abnormality         Status                     ---------                               -----------         ------                     CBC Auto " Differential[[id number]]        Abnormal            Final result                 Please view results for these tests on the individual orders.    CBC Auto Differential [[id number]]  (Abnormal) Collected:  11/05/19 0915    Specimen:  Blood Updated:  11/05/19 0947     WBC 14.73 10*3/mm3      RBC 3.05 10*6/mm3      Hemoglobin 9.2 g/dL      Hematocrit 30.2 %      MCV 99.0 fL      MCH 30.2 pg      MCHC 30.5 g/dL      RDW 13.5 %      RDW-SD 48.4 fl      MPV 10.5 fL      Platelets 286 10*3/mm3      Neutrophil % 84.3 %      Lymphocyte % 8.1 %      Monocyte % 7.1 %      Eosinophil % 0.0 %      Basophil % 0.1 %      Immature Grans % 0.4 %      Neutrophils, Absolute 12.43 10*3/mm3      Lymphocytes, Absolute 1.19 10*3/mm3      Monocytes, Absolute 1.04 10*3/mm3      Eosinophils, Absolute 0.00 10*3/mm3      Basophils, Absolute 0.01 10*3/mm3      Immature Grans, Absolute 0.06 10*3/mm3      nRBC 0.0 /100 WBC     Tissue Pathology Exam [[id number]] Collected:  11/04/19 1712    Specimen:  Tissue from Large Intestine, Rectum Updated:  11/05/19 0757    Potassium [[id number]]  (Normal) Collected:  11/04/19 1358    Specimen:  Blood Updated:  11/04/19 2036     Potassium 3.9 mmol/L           No intake/output data recorded.    Alert and oriented.  No nausea or vomiting.  Good pain control  Urine output still low so another liter will be given over 2 hours.  Urine looks clear tonight.  Recheck blood work in the morning.  Overall doing well.    Order Name Source Comment Collection Info Order Time   POTASSIUM    For all patients with renal disease, within 3 days if taking digoxin, potassium-depleting anti-hypertensives, or diuretics. Collected By: Dayana Gayle RN 11/3/2019  8:26 AM   TISSUE PATHOLOGY EXAM Large Intestine, Rectum  Collected By: Don Moore MD 11/4/2019  5:14 PM   .    Don Moore MD  11/05/19  8:27 PM

## 2019-11-06 NOTE — PROGRESS NOTES
"Colon and Rectal [CSGA]    POD # 2    /60   Pulse 91   Temp 98.4 °F (36.9 °C) (Axillary)   Resp 18   Ht 157.5 cm (62.01\")   Wt 71.4 kg (157 lb 6.4 oz)   SpO2 92%   BMI 28.78 kg/m²     Lab Results (last 24 hours)     Procedure Component Value Units Date/Time    Hemoglobin & Hematocrit, Blood [[id number]]  (Abnormal) Collected:  11/06/19 1343    Specimen:  Blood Updated:  11/06/19 1605     Hemoglobin 6.2 g/dL      Hematocrit 20.5 %     Basic Metabolic Panel [[id number]]  (Abnormal) Collected:  11/06/19 0619    Specimen:  Blood Updated:  11/06/19 0745     Glucose 85 mg/dL      BUN 24 mg/dL      Creatinine 2.23 mg/dL      Sodium 134 mmol/L      Potassium 4.6 mmol/L      Chloride 105 mmol/L      CO2 19.0 mmol/L      Calcium 7.8 mg/dL      eGFR Non African Amer 21 mL/min/1.73      BUN/Creatinine Ratio 10.8     Anion Gap 10.0 mmol/L     Narrative:       GFR Normal >60  Chronic Kidney Disease <60  Kidney Failure <15    CBC (No Diff) [[id number]]  (Abnormal) Collected:  11/06/19 0619    Specimen:  Blood Updated:  11/06/19 0729     WBC 11.05 10*3/mm3      RBC 1.98 10*6/mm3      Hemoglobin 6.1 g/dL      Hematocrit 19.5 %      MCV 98.5 fL      MCH 30.8 pg      MCHC 31.3 g/dL      RDW 13.6 %      RDW-SD 48.6 fl      MPV 11.0 fL      Platelets 189 10*3/mm3           I/O this shift:  In: 730 [I.V.:730]  Out: 600 [Urine:550; Stool:50]    Alert and oriented.  No nausea or vomiting.  Good pain control  -year-old slowly improving.  Garay catheter seems to have some problems.  Creatinine down to 2.23.  After considerable fluid boluses her hemoglobin is dropped to 6.1  And transfusions are on the way.  No obvious sign of bleeding.  She feels fine and her abdomen is soft limb sure there is some pelvic bleeding.  Stoma is pink and quiet as expected.  Stable but go slow and ambulating until we know she is steady on her feet.  Continue watching urine output and hemoglobin.  Discussed with hospitalist.      Order Name Source " Comment Collection Info Order Time   POTASSIUM    For all patients with renal disease, within 3 days if taking digoxin, potassium-depleting anti-hypertensives, or diuretics. Collected By: Dayana Gayle RN 11/3/2019  8:26 AM   TISSUE PATHOLOGY EXAM Large Intestine, Rectum  Collected By: Don Moore MD 11/4/2019  5:14 PM   .    Don Moore MD  11/06/19  4:06 PM

## 2019-11-06 NOTE — PROGRESS NOTES
[facility] Medicine Services  PROGRESS NOTE    Patient Name: Jessica Durham  : 1945  MRN: [id number]    Date of Admission: 2019  Primary Care Physician: Isabella Munroe MD    Subjective     CC: f/u SHAMIR, acute anemia    HPI:  In bed,  at bedside. Pain is controlled. Tolerating full liquid diet with no nausea or vomiting. Worked with [facility] nurse earlier this morning. We discussed low Hgb and need for blood transfusion. She is in agreement.     Review of Systems  Gen- No fevers, chills  CV- No chest pain, palpitations  Resp- No cough, dyspnea  GI- No N/V/D, (+) postsurgical abdominal pain    Objective     Vital Signs:   Temp:  [98.7 °F (37.1 °C)-99 °F (37.2 °C)] 98.7 °F (37.1 °C)  Heart Rate:  [110-111] 111  Resp:  [18] 18  BP: (106-109)/(55-57) 109/55        Physical Exam:  Constitutional: No acute distress, awake, alert and conversant. Laying in bed with  at bedside   HENT: NCAT, mucous membranes moist  Respiratory: Clear to auscultation bilaterally, respiratory effort normal   Cardiovascular: RRR, no murmurs, rubs, or gallops, palpable pedal pulses bilaterally  Gastrointestinal: Positive bowel sounds, soft, appropriately tender. Midline abdominal incision dressed (did not remove for exam), LLQ stoma pini/moist with brown/maroon-tinged liquid in bag   Musculoskeletal: No bilateral ankle edema  Psychiatric: Appropriate affect, cooperative  Neurologic: Oriented x 3, strength symmetric in all extremities, Cranial Nerves grossly intact to confrontation, speech clear  Skin: No rashes    Results Reviewed:    Results from last 7 days   Lab Units 19  0619 19  0915 10/30/19  1142   WBC 10*3/mm3 11.05* 14.73* 6.54   HEMOGLOBIN g/dL 6.1* 9.2* 10.9*   HEMATOCRIT % 19.5* 30.2* 35.0   PLATELETS 10*3/mm3 189 286 293     Results from last 7 days   Lab Units 19  0619 19  1351 19  0915   SODIUM mmol/L 134* 134* 134*   POTASSIUM mmol/L 4.6 4.7 4.8    CHLORIDE mmol/L 105 103 100   CO2 mmol/L 19.0* 17.0* 20.0*   BUN mg/dL 24* 23 22   CREATININE mg/dL 2.23* 2.37* 2.28*   GLUCOSE mg/dL 85 152* 150*   CALCIUM mg/dL 7.8* 8.2* 8.7     Estimated Creatinine Clearance: 20.2 mL/min (A) (by C-G formula based on SCr of 2.23 mg/dL (H)).    Microbiology Results Abnormal     None        Imaging Results (Last 24 Hours)     ** No results found for the last 24 hours. **        I have reviewed the medications:  Scheduled Meds:    acetaminophen 1,000 mg Oral Q8H   ampicillin-sulbactam 1.5 g Intravenous Q12H   cetirizine 10 mg Oral Nightly   nystatin  Topical Q12H   pantoprazole 40 mg Oral QAM     Continuous Infusions:    lactated ringers 100 mL/hr Last Rate: 100 mL/hr (11/06/19 0919)     PRN Meds:  diazePAM  •  Morphine **AND** naloxone  •  ondansetron  •  oxyCODONE    Assessment / Plan     Active Hospital Problems    Diagnosis  POA   • **S/P partial colectomy [Z90.49]  Not Applicable   • Essential hypertension [I10]  Yes   • Hyperlipidemia [E78.5]  Yes   • GERD (gastroesophageal reflux disease) [K21.9]  Yes   • Anemia [D64.9]  Yes   • Vaginal prolapse [N81.10]  Yes   • S/P colostomy (CMS/[facility]) [Z93.3]  Not Applicable   • Acute renal failure superimposed on stage 3 chronic kidney disease (CMS/[facility]) [N17.9, N18.3]  Clinically Undetermined   • Rectal prolapse [K62.3]  Yes      Resolved Hospital Problems   No resolved problems to display.     Brief Hospital Course to date:  Jessica Durham is a 74 y.o. female with PMH significant for HTN, HLD, CKD III, anemia of chronic disease, GERD, gout and arthritis. She has been followed by Dr. Moore for rectal and vaginal prolapse with mara incontinence. She was admitted to [facility] on 11/4/2019 for elective proctosigmoid colectomy, colostomy, appendectomy, vaginopexy with intrinsic bridge, umbilical hernia repair and omentum flap by Dr. Moore. She tolerated the procedure well. Hospital medicine consulted postoperatively  for medical management.      Rectal and vaginal prolapse   - s/p proctosigmoid colectomy, colostomy, appendectomy, vaginopexy with intrinsic bridge, umbilical hernia repair and omentum flap by Dr. Moore 11/4/2019  - Postoperative care per CRS team   - Continue sinclair catheter to monitor urine output   - Tolerating full liquid diet and having ostomy output, advance diet to GI soft     Acute blood loss anemia  - Hgb 6.1 today, likely some degree of dilution given SHAMIR and IV fluids but the degree of decrease is concerning for source of active bleeding    - Transfuse 2 units PRBCs - discussed with Dr. Moore who agrees with plan of care   - Discussed with RN who is concerned patient may have active bleeding from ostomy - monitor closely   - Continue home PO iron supplement  - Q8H H&H for now     SHAMIR on CKD III  - Baseline creatinine 1.4 - currently 2.23  - Likely worsened by acute blood loss anemia   - IV fluids at 100mL/hr and repeat BMP in AM   - Urine output improving. Sinclair out tomorrow if urine output stays up and OK with surgeon      Leukocytosis, improving  - Likely post-reactive but does also have urine culture (+) lactobacillus species  - This is normal  richard for women and it is unclear if this is pathogenic but will treat for now with ampicillin IV x 3 days      Essential hypertension  - Holding home BP meds due to low BPs and SHAMIR     Hyperlipidemia  - Continue home statin      GERD  - Continue home meds. Stable.    DVT Prophylaxis: SCDs - hold Lovenox for now   Disposition: I expect the patient to be discharged home in a few days    CODE STATUS:   Code Status and Medical Interventions:   Ordered at: 11/04/19 1929     Code Status:    CPR     Medical Interventions (Level of Support Prior to Arrest):    Full     Electronically signed by Diamond Hadley PA-C, 11/06/19, 9:02 AM.

## 2019-11-07 LAB
ABO + RH BLD: NORMAL
ABO + RH BLD: NORMAL
ANION GAP SERPL CALCULATED.3IONS-SCNC: 9 MMOL/L (ref 5–15)
BH BB BLOOD EXPIRATION DATE: NORMAL
BH BB BLOOD EXPIRATION DATE: NORMAL
BH BB BLOOD TYPE BARCODE: 600
BH BB BLOOD TYPE BARCODE: 600
BH BB DISPENSE STATUS: NORMAL
BH BB DISPENSE STATUS: NORMAL
BH BB PRODUCT CODE: NORMAL
BH BB PRODUCT CODE: NORMAL
BH BB UNIT NUMBER: NORMAL
BH BB UNIT NUMBER: NORMAL
BUN BLD-MCNC: 25 MG/DL (ref 8–23)
BUN/CREAT SERPL: 13.4 (ref 7–25)
CALCIUM SPEC-SCNC: 8.4 MG/DL (ref 8.6–10.5)
CHLORIDE SERPL-SCNC: 108 MMOL/L (ref 98–107)
CO2 SERPL-SCNC: 19 MMOL/L (ref 22–29)
CREAT BLD-MCNC: 1.86 MG/DL (ref 0.57–1)
CROSSMATCH INTERPRETATION: NORMAL
CROSSMATCH INTERPRETATION: NORMAL
DEPRECATED RDW RBC AUTO: 50.3 FL (ref 37–54)
ERYTHROCYTE [DISTWIDTH] IN BLOOD BY AUTOMATED COUNT: 13.9 % (ref 12.3–15.4)
GFR SERPL CREATININE-BSD FRML MDRD: 26 ML/MIN/1.73
GLUCOSE BLD-MCNC: 68 MG/DL (ref 65–99)
HCT VFR BLD AUTO: 29.2 % (ref 34–46.6)
HCT VFR BLD AUTO: 31.1 % (ref 34–46.6)
HGB BLD-MCNC: 9.4 G/DL (ref 12–15.9)
HGB BLD-MCNC: 9.9 G/DL (ref 12–15.9)
MCH RBC QN AUTO: 31.1 PG (ref 26.6–33)
MCHC RBC AUTO-ENTMCNC: 31.8 G/DL (ref 31.5–35.7)
MCV RBC AUTO: 97.8 FL (ref 79–97)
PLATELET # BLD AUTO: 197 10*3/MM3 (ref 140–450)
PMV BLD AUTO: 10.8 FL (ref 6–12)
POTASSIUM BLD-SCNC: 4.6 MMOL/L (ref 3.5–5.2)
RBC # BLD AUTO: 3.18 10*6/MM3 (ref 3.77–5.28)
SODIUM BLD-SCNC: 136 MMOL/L (ref 136–145)
UNIT  ABO: NORMAL
UNIT  ABO: NORMAL
UNIT  RH: NORMAL
UNIT  RH: NORMAL
WBC NRBC COR # BLD: 10.67 10*3/MM3 (ref 3.4–10.8)

## 2019-11-07 PROCEDURE — 80048 BASIC METABOLIC PNL TOTAL CA: CPT | Performed by: PHYSICIAN ASSISTANT

## 2019-11-07 PROCEDURE — 94799 UNLISTED PULMONARY SVC/PX: CPT

## 2019-11-07 PROCEDURE — 85014 HEMATOCRIT: CPT | Performed by: PHYSICIAN ASSISTANT

## 2019-11-07 PROCEDURE — 85018 HEMOGLOBIN: CPT | Performed by: PHYSICIAN ASSISTANT

## 2019-11-07 PROCEDURE — 25010000002 ONDANSETRON PER 1 MG: Performed by: COLON & RECTAL SURGERY

## 2019-11-07 PROCEDURE — 25010000003 AMPICILLIN-SULBACTAM PER 1.5 G: Performed by: PHYSICIAN ASSISTANT

## 2019-11-07 PROCEDURE — 99232 SBSQ HOSP IP/OBS MODERATE 35: CPT | Performed by: NURSE PRACTITIONER

## 2019-11-07 PROCEDURE — 85027 COMPLETE CBC AUTOMATED: CPT | Performed by: PHYSICIAN ASSISTANT

## 2019-11-07 RX ADMIN — NYSTATIN: 100000 POWDER TOPICAL at 21:10

## 2019-11-07 RX ADMIN — AMPICILLIN SODIUM AND SULBACTAM SODIUM 1.5 G: 1; .5 INJECTION, POWDER, FOR SOLUTION INTRAMUSCULAR; INTRAVENOUS at 21:10

## 2019-11-07 RX ADMIN — OXYCODONE HYDROCHLORIDE 5 MG: 5 TABLET ORAL at 21:21

## 2019-11-07 RX ADMIN — AMPICILLIN SODIUM AND SULBACTAM SODIUM 1.5 G: 1; .5 INJECTION, POWDER, FOR SOLUTION INTRAMUSCULAR; INTRAVENOUS at 09:34

## 2019-11-07 RX ADMIN — PANTOPRAZOLE SODIUM 40 MG: 40 TABLET, DELAYED RELEASE ORAL at 08:31

## 2019-11-07 RX ADMIN — CETIRIZINE HYDROCHLORIDE 10 MG: 10 TABLET, FILM COATED ORAL at 21:10

## 2019-11-07 RX ADMIN — NYSTATIN: 100000 POWDER TOPICAL at 08:31

## 2019-11-07 RX ADMIN — ONDANSETRON 4 MG: 2 INJECTION INTRAMUSCULAR; INTRAVENOUS at 09:27

## 2019-11-07 RX ADMIN — OXYCODONE HYDROCHLORIDE 5 MG: 5 TABLET ORAL at 08:39

## 2019-11-07 NOTE — PROGRESS NOTES
[facility] Medicine Services  PROGRESS NOTE    Patient Name: Jessica Durham  : 1945  MRN: [id number]    Date of Admission: 2019  Primary Care Physician: Isabella Munroe MD    Subjective     CC: f/u SHAMIR, acute anemia    HPI:  Has been walking in halls today. Sore abdomen, but more complaint of chronic back pain. No n/v. Has had good ostomy output, has not voided on her own yet, but sinclair removed today and monitoring.     Review of Systems  Gen- No fevers, chills  CV- No chest pain, palpitations  Resp- No cough, dyspnea  GI- No N/V/D, (+) postsurgical abdominal pain    Objective     Vital Signs:   Temp:  [98.6 °F (37 °C)-98.9 °F (37.2 °C)] 98.7 °F (37.1 °C)  Heart Rate:  [] 109  Resp:  [16-18] 18  BP: (111-139)/(60-76) 139/63        Physical Exam:  Constitutional: No acute distress, awake, alert and conversant. Laying in bed with  at bedside   HENT: NCAT, mucous membranes moist  Respiratory: Clear to auscultation bilaterally, respiratory effort normal   Cardiovascular: RRR, no murmurs, rubs, or gallops, palpable pedal pulses bilaterally  Gastrointestinal: Positive bowel sounds, soft, appropriately tender. Midline abdominal incision dressed (did not remove for exam), LLQ stoma pini/moist with brown/yellow liquid in bag   Musculoskeletal: No bilateral ankle edema  Psychiatric: Appropriate affect, cooperative  Neurologic: Oriented x 3, strength symmetric in all extremities, Cranial Nerves grossly intact to confrontation, speech clear  Skin: No rashes    Results Reviewed:    Results from last 7 days   Lab Units 19  1402 19  0548 19  2253  19  0619 19  0915   WBC 10*3/mm3  --  10.67  --   --  11.05* 14.73*   HEMOGLOBIN g/dL 9.4* 9.9* 9.2*   < > 6.1* 9.2*   HEMATOCRIT % 29.2* 31.1* 28.5*   < > 19.5* 30.2*   PLATELETS 10*3/mm3  --  197  --   --  189 286    < > = values in this interval not displayed.     Results from last 7 days   Lab  Units 11/07/19  0621 11/06/19  0619 11/05/19  1351   SODIUM mmol/L 136 134* 134*   POTASSIUM mmol/L 4.6 4.6 4.7   CHLORIDE mmol/L 108* 105 103   CO2 mmol/L 19.0* 19.0* 17.0*   BUN mg/dL 25* 24* 23   CREATININE mg/dL 1.86* 2.23* 2.37*   GLUCOSE mg/dL 68 85 152*   CALCIUM mg/dL 8.4* 7.8* 8.2*     Estimated Creatinine Clearance: 24.2 mL/min (A) (by C-G formula based on SCr of 1.86 mg/dL (H)).    Microbiology Results Abnormal     None        Imaging Results (Last 24 Hours)     ** No results found for the last 24 hours. **        I have reviewed the medications:  Scheduled Meds:    ampicillin-sulbactam 1.5 g Intravenous Q12H   cetirizine 10 mg Oral Nightly   nystatin  Topical Q12H   pantoprazole 40 mg Oral QAM     Continuous Infusions:     PRN Meds:  diazePAM  •  Morphine **AND** naloxone  •  ondansetron  •  oxyCODONE    Assessment / Plan     Active Hospital Problems    Diagnosis  POA   • **S/P partial colectomy [Z90.49]  Not Applicable   • Essential hypertension [I10]  Yes   • Hyperlipidemia [E78.5]  Yes   • GERD (gastroesophageal reflux disease) [K21.9]  Yes   • Anemia [D64.9]  Yes   • Vaginal prolapse [N81.10]  Yes   • S/P colostomy (CMS/[facility]) [Z93.3]  Not Applicable   • Acute renal failure superimposed on stage 3 chronic kidney disease (CMS/[facility]) [N17.9, N18.3]  Clinically Undetermined   • Rectal prolapse [K62.3]  Yes      Resolved Hospital Problems   No resolved problems to display.     Brief Hospital Course to date:  Jessica Durham is a 74 y.o. female with PMH significant for HTN, HLD, CKD III, anemia of chronic disease, GERD, gout and arthritis. She has been followed by Dr. Moore for rectal and vaginal prolapse with mara incontinence. She was admitted to [facility] on 11/4/2019 for elective proctosigmoid colectomy, colostomy, appendectomy, vaginopexy with intrinsic bridge, umbilical hernia repair and omentum flap by Dr. Moore. She tolerated the procedure well. Hospital medicine consulted  postoperatively for medical management.      Rectal and vaginal prolapse   - s/p proctosigmoid colectomy, colostomy, appendectomy, vaginopexy with intrinsic bridge, umbilical hernia repair and omentum flap by Dr. Moore 11/4/2019  - Postoperative care per CRS team   - Tolerating advance diet to GI soft     Acute blood loss anemia  - Hgb 6.1, likely some degree of dilution given SHAMIR and IV fluids but the degree of decrease is concerning for source of active bleeding    - Transfuse 2 units PRBCs - discussed with Dr. Moore who agrees with plan of care   - Discussed with RN who is concerned patient may have active bleeding from ostomy - monitor closely   - Continue home PO iron supplement  - Q8H H&H with improvement and stable, two readings today of hemoglobin > 9.0, will obtain CBC in AM and stop serial checks for now      SHAMIR on CKD III  - Baseline creatinine 1.4 - back down now to 1.8/ readily improving   - Likely worsened by acute blood loss anemia   - IVF off now  - Urine output improving  --encouraged to cont to maintain good hydration by mouth, repeat BMP in AM     Leukocytosis, improving  - Likely post-reactive but does also have urine culture (+) lactobacillus species  - This is normal  richard for women and it is unclear if this is pathogenic but will treat for now with ampicillin IV x 3 days      Essential hypertension  - Holding home BP meds due to low BPs and SHAMIR  --BP improving, as well as hydration status and SHAMIR, consider restarting ACEi and HCTZ when appropriate/ needed      Hyperlipidemia  - Continue home statin      GERD  - Continue home meds. Stable.    DVT Prophylaxis: SCDs - hold Lovenox for now   Disposition: I expect the patient to be discharged home in 3-4 days per surgery    CODE STATUS:   Code Status and Medical Interventions:   Ordered at: 11/04/19 1929     Code Status:    CPR     Medical Interventions (Level of Support Prior to Arrest):    Full     Electronically signed by Ju CROSS  John, KRISTIAN, 11/07/19, 3:12 PM.       [2+] : left 2+ [Ankle Swelling (On Exam)] : present [Varicose Veins Of Lower Extremities] : bilaterally [] : bilaterally [Ankle Swelling Bilaterally] : severe [No Rash or Lesion] : No rash or lesion [Alert] : alert [Oriented to Person] : oriented to person [Oriented to Place] : oriented to place [Oriented to Time] : oriented to time [Calm] : calm [de-identified] : NAD [de-identified] : EOMI [de-identified] : supple [de-identified] : unlabored breathing [de-identified] : irregular rhythm [FreeTextEntry1] : <2 sec cap refill\par multiple large varicose veins [de-identified] : soft, NT, ND, no pulsatile mass [de-identified] : FROM of all 4 extremities

## 2019-11-07 NOTE — PROGRESS NOTES
Continued Stay Note  [facility]     Patient Name: Jessica Durham  MRN: [id number]  Today's Date: 11/7/2019    Admit Date: 11/4/2019    Discharge Plan     Row Name 11/07/19 1620       Plan    Plan  Home    Patient/Family in Agreement with Plan  yes    Plan Comments  Spoke with patient.  She does not think she will have any discharge need but stated it is a little early to decide for sure.  CM will f/u tomorrow.      Final Discharge Disposition Code  01 - home or self-care        Discharge Codes    No documentation.       Expected Discharge Date and Time     Expected Discharge Date Expected Discharge Time    Nov 8, 2019             Natty Perez RN

## 2019-11-07 NOTE — NURSING NOTE
Inpatient Ostomy Therapy Note    Date of Surgery: 11/4/19      Days Post Procedure:  3 Days Post-Op    Surgeon:  Don Moore MD    Ostomy:  Colostomy- LLQ    Appliance Type:  Radha, 2 Piece and Flat    Appliance Size:  Size:  2-1/4    Stoma Description: Viable, Red and Moist    Stoma Size:  35mm    Peristomal Skin:  Intact    Last Appliance Change:  11/7/19    Accessories:  Barrier [place]    Education:  Diet, Showering, Emptying, Changing Appliance, Ordering Supplies, [employer] [place] and Peristomal Skin Issues    Dressing Change:  Yes    Supplies Provided:  No    Education Folder Provided:  Yes    Plan of Care:  WOC Visit    Teaching provided to:  Patient and Spouse    Comments:  Stoma red and moist; protruding nicely above skin level. Peristomal skin slightly reddened, but intact. 50mL liquid dark brown stool emptied from pouch by pt. Pt has large bruise left labia; no treatment needed at this time.     Summary:  Appliance change performed by pt with assistance from WOCN, using Radha #[id number] 2 piece flat. Education reviewed with pt and , including ordering of supplies. Will plan to change appliance again prior to discharge. WOC nurse will f/u. Please contact WOC nurse as needed for concerns.     Kaylin Pollack, KRISTIAN - 11/07/19, 1:15 PM

## 2019-11-08 LAB
ANION GAP SERPL CALCULATED.3IONS-SCNC: 8 MMOL/L (ref 5–15)
BASOPHILS # BLD AUTO: 0.02 10*3/MM3 (ref 0–0.2)
BASOPHILS NFR BLD AUTO: 0.2 % (ref 0–1.5)
BUN BLD-MCNC: 20 MG/DL (ref 8–23)
BUN/CREAT SERPL: 14.3 (ref 7–25)
CALCIUM SPEC-SCNC: 8.3 MG/DL (ref 8.6–10.5)
CHLORIDE SERPL-SCNC: 107 MMOL/L (ref 98–107)
CO2 SERPL-SCNC: 22 MMOL/L (ref 22–29)
CREAT BLD-MCNC: 1.4 MG/DL (ref 0.57–1)
CYTO UR: NORMAL
DEPRECATED RDW RBC AUTO: 49.5 FL (ref 37–54)
EOSINOPHIL # BLD AUTO: 0.2 10*3/MM3 (ref 0–0.4)
EOSINOPHIL NFR BLD AUTO: 2 % (ref 0.3–6.2)
ERYTHROCYTE [DISTWIDTH] IN BLOOD BY AUTOMATED COUNT: 13.8 % (ref 12.3–15.4)
GFR SERPL CREATININE-BSD FRML MDRD: 37 ML/MIN/1.73
GLUCOSE BLD-MCNC: 94 MG/DL (ref 65–99)
HCT VFR BLD AUTO: 27.4 % (ref 34–46.6)
HGB BLD-MCNC: 8.6 G/DL (ref 12–15.9)
IMM GRANULOCYTES # BLD AUTO: 0.04 10*3/MM3 (ref 0–0.05)
IMM GRANULOCYTES NFR BLD AUTO: 0.4 % (ref 0–0.5)
LAB AP CASE REPORT: NORMAL
LAB AP CLINICAL INFORMATION: NORMAL
LYMPHOCYTES # BLD AUTO: 2.62 10*3/MM3 (ref 0.7–3.1)
LYMPHOCYTES NFR BLD AUTO: 26 % (ref 19.6–45.3)
MCH RBC QN AUTO: 30.8 PG (ref 26.6–33)
MCHC RBC AUTO-ENTMCNC: 31.4 G/DL (ref 31.5–35.7)
MCV RBC AUTO: 98.2 FL (ref 79–97)
MONOCYTES # BLD AUTO: 1.07 10*3/MM3 (ref 0.1–0.9)
MONOCYTES NFR BLD AUTO: 10.6 % (ref 5–12)
NEUTROPHILS # BLD AUTO: 6.14 10*3/MM3 (ref 1.7–7)
NEUTROPHILS NFR BLD AUTO: 60.8 % (ref 42.7–76)
NRBC BLD AUTO-RTO: 0 /100 WBC (ref 0–0.2)
PATH REPORT.FINAL DX SPEC: NORMAL
PATH REPORT.GROSS SPEC: NORMAL
PLATELET # BLD AUTO: 174 10*3/MM3 (ref 140–450)
PMV BLD AUTO: 10.4 FL (ref 6–12)
POTASSIUM BLD-SCNC: 4.6 MMOL/L (ref 3.5–5.2)
RBC # BLD AUTO: 2.79 10*6/MM3 (ref 3.77–5.28)
SODIUM BLD-SCNC: 137 MMOL/L (ref 136–145)
WBC NRBC COR # BLD: 10.09 10*3/MM3 (ref 3.4–10.8)

## 2019-11-08 PROCEDURE — 97110 THERAPEUTIC EXERCISES: CPT

## 2019-11-08 PROCEDURE — 97530 THERAPEUTIC ACTIVITIES: CPT

## 2019-11-08 PROCEDURE — 97162 PT EVAL MOD COMPLEX 30 MIN: CPT

## 2019-11-08 PROCEDURE — 85025 COMPLETE CBC W/AUTO DIFF WBC: CPT | Performed by: NURSE PRACTITIONER

## 2019-11-08 PROCEDURE — 80048 BASIC METABOLIC PNL TOTAL CA: CPT | Performed by: NURSE PRACTITIONER

## 2019-11-08 PROCEDURE — 25010000003 AMPICILLIN-SULBACTAM PER 1.5 G: Performed by: PHYSICIAN ASSISTANT

## 2019-11-08 PROCEDURE — 99232 SBSQ HOSP IP/OBS MODERATE 35: CPT | Performed by: NURSE PRACTITIONER

## 2019-11-08 PROCEDURE — 97116 GAIT TRAINING THERAPY: CPT

## 2019-11-08 RX ADMIN — PANTOPRAZOLE SODIUM 40 MG: 40 TABLET, DELAYED RELEASE ORAL at 08:28

## 2019-11-08 RX ADMIN — NYSTATIN: 100000 POWDER TOPICAL at 23:14

## 2019-11-08 RX ADMIN — AMPICILLIN SODIUM AND SULBACTAM SODIUM 1.5 G: 1; .5 INJECTION, POWDER, FOR SOLUTION INTRAMUSCULAR; INTRAVENOUS at 11:08

## 2019-11-08 RX ADMIN — NYSTATIN: 100000 POWDER TOPICAL at 08:28

## 2019-11-08 RX ADMIN — CETIRIZINE HYDROCHLORIDE 10 MG: 10 TABLET, FILM COATED ORAL at 23:27

## 2019-11-08 NOTE — PLAN OF CARE
Problem: Fall Risk (Adult)  Goal: Absence of Fall  Outcome: Ongoing (interventions implemented as appropriate)   11/08/19 4856   Fall Risk (Adult)   Absence of Fall making progress toward outcome

## 2019-11-08 NOTE — PROGRESS NOTES
"Colon and Rectal [CSGA]    POD # 4    /72 (BP Location: Right arm, Patient Position: Lying)   Pulse 95   Temp 98.7 °F (37.1 °C) (Oral)   Resp 18   Ht 157.5 cm (62.01\")   Wt 71.5 kg (157 lb 11.2 oz)   SpO2 93%   BMI 28.84 kg/m²     Lab Results (last 24 hours)     Procedure Component Value Units Date/Time    Basic Metabolic Panel [[id number]]  (Abnormal) Collected:  11/08/19 0512    Specimen:  Blood Updated:  11/08/19 0626     Glucose 94 mg/dL      BUN 20 mg/dL      Creatinine 1.40 mg/dL      Sodium 137 mmol/L      Potassium 4.6 mmol/L      Chloride 107 mmol/L      CO2 22.0 mmol/L      Calcium 8.3 mg/dL      eGFR Non African Amer 37 mL/min/1.73      BUN/Creatinine Ratio 14.3     Anion Gap 8.0 mmol/L     Narrative:       GFR Normal >60  Chronic Kidney Disease <60  Kidney Failure <15    CBC & Differential [[id number]] Collected:  11/08/19 0512    Specimen:  Blood Updated:  11/08/19 0602    Narrative:       The following orders were created for panel order CBC & Differential.  Procedure                               Abnormality         Status                     ---------                               -----------         ------                     CBC Auto Differential[[id number]]        Abnormal            Final result                 Please view results for these tests on the individual orders.    CBC Auto Differential [[id number]]  (Abnormal) Collected:  11/08/19 0512    Specimen:  Blood Updated:  11/08/19 0602     WBC 10.09 10*3/mm3      RBC 2.79 10*6/mm3      Hemoglobin 8.6 g/dL      Hematocrit 27.4 %      MCV 98.2 fL      MCH 30.8 pg      MCHC 31.4 g/dL      RDW 13.8 %      RDW-SD 49.5 fl      MPV 10.4 fL      Platelets 174 10*3/mm3      Neutrophil % 60.8 %      Lymphocyte % 26.0 %      Monocyte % 10.6 %      Eosinophil % 2.0 %      Basophil % 0.2 %      Immature Grans % 0.4 %      Neutrophils, Absolute 6.14 10*3/mm3      Lymphocytes, Absolute 2.62 10*3/mm3      Monocytes, Absolute 1.07 10*3/mm3      " Eosinophils, Absolute 0.20 10*3/mm3      Basophils, Absolute 0.02 10*3/mm3      Immature Grans, Absolute 0.04 10*3/mm3      nRBC 0.0 /100 WBC     Hemoglobin & Hematocrit, Blood [[id number]]  (Abnormal) Collected:  11/07/19 1402    Specimen:  Blood Updated:  11/07/19 1433     Hemoglobin 9.4 g/dL      Hematocrit 29.2 %           I/O this shift:  In: -   Out: 300 [Urine:300]    Alert and oriented.  No nausea or vomiting.  Good pain control  Good UO. Labs stable  Though weak.  Colostomy functioning well but the family does not have a command of changing the bag.  Keep at least another day.    Order Name Source Comment Collection Info Order Time   POTASSIUM    For all patients with renal disease, within 3 days if taking digoxin, potassium-depleting anti-hypertensives, or diuretics. Collected By: Dayana Gayle RN 11/3/2019  8:26 AM   TISSUE PATHOLOGY EXAM Large Intestine, Rectum  Collected By: Don Moore MD 11/4/2019  5:14 PM   .    Don Moore MD  11/08/19  2:15 PM

## 2019-11-08 NOTE — PLAN OF CARE
Problem: Patient Care Overview  Goal: Plan of Care Review  Outcome: Ongoing (interventions implemented as appropriate)   11/08/19 1008   Coping/Psychosocial   Plan of Care Reviewed With patient   Plan of Care Review   Progress improving   OTHER   Outcome Summary Seen POD # 4 s/p colect, colost, appy & vaginopexy, w/ post op bleeding & 2 units PRBC's transfused; noted low HGB (8.6), wheezing w/ activ, signif abdom.incis pain (8/10 w/ mobil), decr strength/endurance, & impaired funct mobil; able to amb 430 ft w/ Min HHA on R, w/ 1 stand.rest, + performed bal. activ & LE ther exer, but limited by fatigue, SOB/incr. wheezing, abdom discomf,& leg weakness;plans home w/ asst; needs to practice steps (2nd fl. bed/bath);do not anticipate skilled PT services will be needed at d/c

## 2019-11-08 NOTE — THERAPY EVALUATION
Patient Name: Jessica Durham  : 1945    MRN: [id number]                              Today's Date: 2019       Admit Date: 2019    Visit Dx:     ICD-10-CM ICD-9-CM   1. Impaired functional mobility, balance, gait, and endurance Z74.09 V49.89   2. Rectal prolapse K62.3 569.1   3. Vaginal prolapse N81.10 618.00     Patient Active Problem List   Diagnosis   • Rectal prolapse   • Essential hypertension   • Hyperlipidemia   • GERD (gastroesophageal reflux disease)   • Anemia   • Vaginal prolapse   • S/P colostomy (CMS/HCC)   • S/P partial colectomy   • CKD (chronic kidney disease), stage III (CMS/HCC)   • Acute renal failure superimposed on stage 3 chronic kidney disease (CMS/HCC)     Past Medical History:   Diagnosis Date   • Arthritis    • Gout    • Hyperlipidemia    • Hypertension    • Rectal prolapse    • Rectocele      Past Surgical History:   Procedure Laterality Date   • BLADDER REPAIR     • COLON RESECTION N/A 2019    Procedure: PROCTOSIGMOID COLECTOMY, COLOSTOMY, APPENDECTOMY, REMOVAL OF PESSARY, VAGINOPEXY WITH INTRENSIC BRIDGE, UMBILICAL HERNIA REPAIR, AND OMENTUM FLAP;  Surgeon: Don Moore MD;  Location: [facility] OR;  Service: General   • COLONOSCOPY     • EYE SURGERY      bilateral lens     General Information     Row Name 1934          PT Evaluation Time/Intention    Document Type  evaluation  -DM     Mode of Treatment  physical therapy  -DM     Row Name 1934          General Information    Patient Profile Reviewed?  yes  -DM     Prior Level of Function  independent:;all household mobility;community mobility;gait;transfer;bed mobility;ADL's;home management;cooking;cleaning;driving;shopping;using stairs;dependent:;yard work indep gt w/o AD  -DM     Existing Precautions/Restrictions  other (see comments) Abdom incis.; ostomy;splinted log rolling transf; 2 units PRBC's post op  -DM     Barriers to Rehab  none identified  -DM     Row Name 19 0834           "Relationship/Environment    Lives With  spouse dtr PRN (Works);spouse ret. but gone intermitt.(plays golf)  -DM     Row Name 11/08/19 0834          Resource/Environmental Concerns    Current Living Arrangements  home/apartment/condo 2-st.; tub/show. x 2 ( master Ba. w/tub bench);2 commodes (downst. is low ht.); can stay on 1st fl.during day(\"s/p hyst '08, stayed downst. during day & just dropped things over the rail to 1st fl.@ morning that I needed that day\")    -DM     Row Name 11/08/19 0834          Home Main Entrance    Number of Stairs, Main Entrance  five 5 (front);4-5 from garage  -DM     Row Name 11/08/19 0834          Stairs Within Home, Primary    Number of Stairs, Within Home, Primary  other (see comments) 17 to 2nd fl.B/B  -DM     Stair Railings, Within Home, Primary  railing on right side (ascending)  -DM     Row Name 11/08/19 0834          Cognitive Assessment/Intervention- PT/OT    Orientation Status (Cognition)  oriented x 4  -DM     Row Name 11/08/19 0834          Safety Issues, Functional Mobility    Safety Issues Affecting Function (Mobility)  safety precaution awareness;safety precautions follow-through/compliance  -DM     Impairments Affecting Function (Mobility)  endurance/activity tolerance;pain;postural/trunk control;strength  -DM       User Key  (r) = Recorded By, (t) = Taken By, (c) = Cosigned By    Initials Name Provider Type    DM Wendie Larsen, PT Physical Therapist        Mobility     Row Name 11/08/19 0834          Bed Mobility Assessment/Treatment    Bed Mobility Assessment/Treatment  rolling right;scooting/bridging;sidelying-sit  -DM     Rolling Right [place] (Bed Mobility)  conditional independence;verbal cues  -DM     Scooting/Bridging [place] (Bed Mobility)  supervision  -DM     Sidelying-Sit [place] (Bed Mobility)  minimum assist (75% patient effort)  -DM     Assistive Device (Bed Mobility)  head of bed elevated;bed rails;draw sheet  -DM     Comment (Bed " Mobility)  cued for splinted logrolling w/ post op pillow  -DM     Row Name 11/08/19 0834          Transfer Assessment/Treatment    Comment (Transfers)  cues for seq  -DM     Row Name 11/08/19 0834          Sit-Stand Transfer    Sit-Stand [place] (Transfers)  verbal cues;contact guard  -DM     Assistive Device (Sit-Stand Transfers)  other (see comments) gt belt;cues to splint w/ L hand,utilize bedrail on R  -DM     Row Name 11/08/19 0834          Gait/Stairs Assessment/Training    Gait/Stairs Assessment/Training  gait/ambulation independence  -DM     [place] Level (Gait)  minimum assist (75% patient effort) pt. decl. R wx;preferred HHA on R  -DM     Assistive Device (Gait)  other (see comments) gt belt; min HHA  -DM     Distance in Feet (Gait)  430  -DM     Pattern (Gait)  step-through  -DM     Deviations/Abnormal Patterns (Gait)  base of support, narrow;amanda decreased;stride length decreased DEC step length  -DM     Bilateral Gait Deviations  forward flexed posture;heel strike decreased  -DM     Comment (Gait/Stairs)  cues to incr step length, ext trunk/focus ahead, splint w/ pillow for 2 coughs; PLB  -DM       User Key  (r) = Recorded By, (t) = Taken By, (c) = Cosigned By    Initials Name Provider Type    DM Wendie Larsen, PT Physical Therapist        Obj/Interventions     Row Name 11/08/19 0834          General ROM    GENERAL ROM COMMENTS  B hip flex march.25% d/t abdom incis discomf.  -DM     Row Name 11/08/19 0834          MMT (Manual Muscle Testing)    General MMT Comments  B hip flex 3-/5  -DM     Row Name 11/08/19 0834          Therapeutic Exercise    Lower Extremity (Therapeutic Exercise)  gluteal sets;hamstring sets, bilateral;heel slides, bilateral;LAQ (long arc quad), bilateral;quad sets, bilateral;SAQ (short arc quad), bilateral;gastroc stretch, right gentle marches (to clear foot from floor)   -DM     Lower Extremity Range of Motion (Therapeutic Exercise)  hip abduction/adduction,  bilateral;hip internal/external rotation, bilateral;ankle dorsiflexion/plantar flexion, bilateral  -DM     Exercise Type (Therapeutic Exercise)  AROM (active range of motion);AAROM (active assistive range of motion);isometric contraction, static  -DM     Position (Therapeutic Exercise)  supine;seated  -DM     Sets/Reps (Therapeutic Exercise)  1/10 (1/20 on vivienne, AP's)  -DM     Expected Outcome (Therapeutic Exercise)  improve functional stability;improve functional tolerance, single extremity activity  -DM     Comment (Therapeutic Exercise)  ret. w/ written HEP& instructed; also brought alexis cush. which pt req. for next sitting session  -DM     Row Name 11/08/19 0834          Static Sitting Balance    Level of [place] (Unsupported Sitting, Static Balance)  independent  -DM     Sitting Position (Unsupported Sitting, Static Balance)  sitting on edge of bed  -DM     Time Able to Maintain Position (Unsupported Sitting, Static Balance)  3 to 4 minutes  -DM     Comment (Unsupported Sitting, Static Balance)  LE exer; PLB  -DM     Row Name 11/08/19 0834          Dynamic Sitting Balance    Level of [place], Reaches Outside Midline (Sitting, Dynamic Balance)  minimal assist, 75% patient effort  -DM     Sitting Position, Reaches Outside Midline (Sitting, Dynamic Balance)  sitting in chair;sitting on edge of bed  -DM     Comment, Reaches Outside Midline (Sitting, Dynamic Balance)  RECIP scooting (used draw sheet EOB,& sheet under pt.in chair to facil.)  -DM     Row Name 11/08/19 0834          Static Standing Balance    Level of [place] (Supported Standing, Static Balance)  contact guard assist  -DM     Time Able to Maintain Position (Supported Standing, Static Balance)  45 to 60 seconds  -DM     Assistive Device Utilized (Supported Standing, Static Balance)  other (see comments) gt belt  -DM     Comment (Supported Standing, Static Balance)  trunk ext/ focusing ahead in mirror; PLB  -DM     Row Name 11/08/19 0834           Dynamic Standing Balance    Level of [place], Reaches Outside Midline (Standing, Dynamic Balance)  minimal assist, 75% patient effort  -DM     Time Able to Maintain Position, Reaches Outside Midline (Standing, Dynamic Balance)  45 to 60 seconds  -DM     Assistive Device Utilized (Supported Standing, Dynamic Balance)  other (see comments) GT belt; HHA on R  -DM     Comment, Reaches Outside Midline (Standing, Dynamic Balance)  WS to init sidesteps/backing to chair  -DM       User Key  (r) = Recorded By, (t) = Taken By, (c) = Cosigned By    Initials Name Provider Type    DM Wendie Larsen, PT Physical Therapist        Goals/Plan     Row Name 11/08/19 0834          Bed Mobility Goal 1 (PT)    Activity/Assistive Device (Bed Mobility Goal 1, PT)  bed mobility activities, all  -DM     [place] Level/Cues Needed (Bed Mobility Goal 1, PT)  independent  -DM     Time Frame (Bed Mobility Goal 1, PT)  long term goal (LTG);5 days  -DM     Row Name 11/08/19 0834          Transfer Goal 1 (PT)    Activity/Assistive Device (Transfer Goal 1, PT)  sit-to-stand/stand-to-sit;bed-to-chair/chair-to-bed  -DM     [place] Level/Cues Needed (Transfer Goal 1, PT)  independent  -DM     Time Frame (Transfer Goal 1, PT)  long term goal (LTG);5 days  -DM     Row Name 11/08/19 0834          Gait Training Goal 1 (PT)    Activity/Assistive Device (Gait Training Goal 1, PT)  gait (walking locomotion) Stable VS  -DM     [place] Level (Gait Training Goal 1, PT)  independent  -DM     Distance (Gait Goal 1, PT)  700  -DM     Time Frame (Gait Training Goal 1, PT)  long term goal (LTG);5 days  -DM     Row Name 11/08/19 0834          Stairs Goal 1 (PT)    Activity/Assistive Device (Stairs Goal 1, PT)  ascending stairs;descending stairs;using handrail, right  -DM     [place] Level/Cues Needed (Stairs Goal 1, PT)  supervision required  -DM     Number of Stairs (Stairs Goal 1, PT)  17  -DM     Time Frame (Stairs Goal 1, PT)   long term goal (LTG);5 days  -DM     Row Name 11/08/19 0834          Patient Education Goal (PT)    Activity (Patient Education Goal, PT)  HEP exer  -DM     [place]/Cues/Accuracy (Memory Goal 2, PT)  demonstrates adequately;verbalizes understanding  -DM     Time Frame (Patient Education Goal, PT)  long term goal (LTG);5 days  -DM       User Key  (r) = Recorded By, (t) = Taken By, (c) = Cosigned By    Initials Name Provider Type    DM Wendie Larsen, PT Physical Therapist        Clinical Impression     Row Name 11/08/19 0834          Pain Assessment    Additional Documentation  Pain Scale: Numbers Pre/Post-Treatment (Group)  -DM     Row Name 11/08/19 0834          Pain Scale: Numbers Pre/Post-Treatment    Pain Scale: Numbers, Pretreatment  7/10  -DM     Pain Scale: Numbers, Post-Treatment  8/10  -DM     Pain Location - Orientation  incisional  -DM     Pain Location  abdomen  -DM     Pain Intervention(s)  Repositioned;Rest;Elevated  -DM     Row Name 11/08/19 0834          Plan of Care Review    Plan of Care Reviewed With  patient  -DM     Row Name 11/08/19 0834          Physical Therapy Clinical Impression    Patient/Family Goals Statement (PT Clinical Impression)  improved funct mobil & ret. to PLOF  -DM     Criteria for Skilled Interventions Met (PT Clinical Impression)  yes;treatment indicated  -DM     Rehab Potential (PT Clinical Summary)  good, to achieve stated therapy goals  -DM     Row Name 11/08/19 0834          Vital Signs    Pre Systolic BP Rehab  139  -DM     Pre Treatment Diastolic BP  63  -DM     Post Systolic BP Rehab  140  -DM     Post Treatment Diastolic BP  68  -DM     Pretreatment Heart Rate (beats/min)  95  -DM     Intratreatment Heart Rate (beats/min)  112  -DM     Posttreatment Heart Rate (beats/min)  105  -DM     Pre SpO2 (%)  95  -DM     O2 Delivery Pre Treatment  room air  -DM     O2 Delivery Intra Treatment  room air  -DM     Post SpO2 (%)  97  -DM     O2 Delivery Post Treatment  room  air  -DM     Pre Patient Position  Supine  -DM     Intra Patient Position  Standing  -DM     Post Patient Position  Sitting  -DM     Rest Breaks   1  -DM     Row Name 11/08/19 0834          Positioning and Restraints    Pre-Treatment Position  in bed  -DM     Post Treatment Position  chair  -DM       User Key  (r) = Recorded By, (t) = Taken By, (c) = Cosigned By    Initials Name Provider Type    Wendie Ramirez, PT Physical Therapist        Outcome Measures     Row Name 11/08/19 0834          How much help from another person do you currently need...    Turning from your back to your side while in flat bed without using bedrails?  4  -DM     Moving from lying on back to sitting on the side of a flat bed without bedrails?  3  -DM     Moving to and from a bed to a chair (including a wheelchair)?  3  -DM     Standing up from a chair using your arms (e.g., wheelchair, bedside chair)?  3  -DM     Climbing 3-5 steps with a railing?  2  -DM     To walk in hospital room?  3  -DM     AM-PAC 6 Clicks Score (PT)  18  -DM     Row Name 11/08/19 0834          Functional Assessment    Outcome Measure Options  AM-PAC 6 Clicks Basic Mobility (PT)  -DM       User Key  (r) = Recorded By, (t) = Taken By, (c) = Cosigned By    Initials Name Provider Type    Wendie Ramirez, PT Physical Therapist        Physical Therapy Education     Title: PT OT SLP Therapies (In Progress)     Topic: Physical Therapy (In Progress)     Point: Mobility training (In Progress)     Learning Progress Summary           Patient Eager, E,D,H, NR by DM at 11/8/2019 10:07 AM                   Point: Home exercise program (In Progress)     Learning Progress Summary           Patient Eager, E,D,H, NR by DM at 11/8/2019 10:07 AM                   Point: Body mechanics (In Progress)     Learning Progress Summary           Patient Eager, E,D,H, NR by DM at 11/8/2019 10:07 AM                   Point: Precautions (In Progress)     Learning Progress Summary            Patient Eager, E,D,H, NR by DM at 11/8/2019 10:07 AM                               User Key     Initials Effective Dates Name Provider Type Discipline    DM 06/19/15 -  Wendie Larsen, PT Physical Therapist PT              PT Recommendation and Plan  Planned Therapy Interventions (PT Eval): bed mobility training, gait training, home exercise program, patient/family education, stair training, strengthening, transfer training  Outcome Summary/Treatment Plan (PT)  Anticipated Discharge Disposition (PT): home with assist  Plan of Care Reviewed With: patient  Progress: improving  Outcome Summary: Seen POD # 4 s/p colect, colost, appy & vaginopexy, w/ post op bleeding & 2 units PRBC's transfused; noted low HGB (8.6), wheezing w/ activ, signif abdom.incis pain (8/10 w/ mobil), decr strength/endurance, & impaired funct mobil; able to amb 430 ft w/ Min HHA on R, w/ 1 stand.rest, + performed bal. activ & LE ther exer, but limited by fatigue, SOB/incr. wheezing, abdom discomf,& leg weakness;plans home w/ asst; needs to practice steps (2nd fl. bed/bath);do not anticipate skilled PT services will be needed at d/c      Time Calculation:   PT Charges     Row Name 11/08/19 1015             Time Calculation    Start Time  0834  -DM      PT Received On  11/08/19  -      PT Goal Re-Cert Due Date  11/18/19  -DM         Time Calculation- PT    Total Timed Code Minutes- PT  41 minute(s)  -DM         Timed Charges    60347 - PT Therapeutic Exercise Minutes  13  -DM      73072 - Gait Training Minutes   18  -DM      96948 - PT Therapeutic Activity Minutes  10  -DM        User Key  (r) = Recorded By, (t) = Taken By, (c) = Cosigned By    Initials Name Provider Type    DM Wendie Larsen, PT Physical Therapist        Therapy Charges for Today     Code Description Service Date Service Provider Modifiers Qty    36356101661 HC GAIT TRAINING EA 15 MIN 11/8/2019 Wendie Larsen, PT GP 1    42131720710 HC PT THER PROC EA 15 MIN 11/8/2019  Wendie Larsen, PT GP 1    89106259416 HC PT THERAPEUTIC ACT EA 15 MIN 11/8/2019 Wendie Larsen, PT GP 1    08216743805 HC PT EVAL MOD COMPLEXITY 4 11/8/2019 Wendie Larsen, PT GP 1          PT G-Codes  Outcome Measure Options: AM-PAC 6 Clicks Basic Mobility (PT)  AM-PAC 6 Clicks Score (PT): 18    Wendie Larsen, PT  11/8/2019

## 2019-11-08 NOTE — PROGRESS NOTES
[facility] Medicine Services  PROGRESS NOTE    Patient Name: Jessica Durham  : 1945  MRN: [id number]    Date of Admission: 2019  Primary Care Physician: Isabella Munroe MD    Subjective     CC: f/u SHAMIR, acute anemia    HPI:  Has been walking in halls today x 2. States she seems to be doing better with strength. PT recommends skilled PT, however patient unsure she will need that. Good output in ostomy. Denies n/v and tolerating diet well.    Review of Systems  Gen- No fevers, chills  CV- No chest pain, palpitations  Resp- No cough, dyspnea  GI- No N/V/D, (+) postsurgical abdominal tenderness    Objective     Vital Signs:   Temp:  [98.7 °F (37.1 °C)] 98.7 °F (37.1 °C)  Heart Rate:  [95] 95  Resp:  [18] 18  BP: (126-139)/(63-72) 126/72        Physical Exam:  Constitutional: No acute distress, awake, alert sitting up in bed  HENT: NCAT, mucous membranes moist  Respiratory: Clear to auscultation bilaterally, respiratory effort normal   Cardiovascular: RRR, no murmurs, rubs, or gallops, palpable pedal pulses bilaterally  Gastrointestinal: Positive bowel sounds, soft, appropriately tender. Midline abdominal incision intact, LLQ stoma pinl/moist with brown stool in bag  Musculoskeletal: No bilateral ankle edema  Psychiatric: Appropriate affect, cooperative  Neurologic: Oriented x 3, strength symmetric in all extremities, Cranial Nerves grossly intact to confrontation, speech clear  Skin: No rashes    Results Reviewed:    Results from last 7 days   Lab Units 19  0519  1402 19  0548  19  0619   WBC 10*3/mm3 10.09  --  10.67  --  11.05*   HEMOGLOBIN g/dL 8.6* 9.4* 9.9*   < > 6.1*   HEMATOCRIT % 27.4* 29.2* 31.1*   < > 19.5*   PLATELETS 10*3/mm3 174  --  197  --  189    < > = values in this interval not displayed.     Results from last 7 days   Lab Units 19  0519  0621 1919   SODIUM mmol/L 137 136 134*   POTASSIUM mmol/L 4.6 4.6  4.6   CHLORIDE mmol/L 107 108* 105   CO2 mmol/L 22.0 19.0* 19.0*   BUN mg/dL 20 25* 24*   CREATININE mg/dL 1.40* 1.86* 2.23*   GLUCOSE mg/dL 94 68 85   CALCIUM mg/dL 8.3* 8.4* 7.8*     Estimated Creatinine Clearance: 32.2 mL/min (A) (by C-G formula based on SCr of 1.4 mg/dL (H)).    Microbiology Results Abnormal     None        Imaging Results (Last 24 Hours)     ** No results found for the last 24 hours. **        I have reviewed the medications:  Scheduled Meds:    cetirizine 10 mg Oral Nightly   nystatin  Topical Q12H   pantoprazole 40 mg Oral QAM     Continuous Infusions:     PRN Meds:  diazePAM  •  Morphine **AND** naloxone  •  ondansetron  •  oxyCODONE    Assessment / Plan     Active Hospital Problems    Diagnosis  POA   • **S/P partial colectomy [Z90.49]  Not Applicable   • Essential hypertension [I10]  Yes   • Hyperlipidemia [E78.5]  Yes   • GERD (gastroesophageal reflux disease) [K21.9]  Yes   • Anemia [D64.9]  Yes   • Vaginal prolapse [N81.10]  Yes   • S/P colostomy (CMS/HCC) [Z93.3]  Not Applicable   • Acute renal failure superimposed on stage 3 chronic kidney disease (CMS/HCC) [N17.9, N18.3]  Clinically Undetermined   • Rectal prolapse [K62.3]  Yes      Resolved Hospital Problems   No resolved problems to display.     Brief Hospital Course to date:  Jessica Durham is a 74 y.o. female with PMH significant for HTN, HLD, CKD III, anemia of chronic disease, GERD, gout and arthritis. She has been followed by Dr. Moore for rectal and vaginal prolapse with mara incontinence. She was admitted to [facility] on 11/4/2019 for elective proctosigmoid colectomy, colostomy, appendectomy, vaginopexy with intrinsic bridge, umbilical hernia repair and omentum flap by Dr. Moore. She tolerated the procedure well. Hospital medicine consulted postoperatively for medical management.      Rectal and vaginal prolapse   - s/p proctosigmoid colectomy, colostomy, appendectomy, vaginopexy with intrinsic  bridge, umbilical hernia repair and omentum flap by Dr. Moore 11/4/2019  - Postoperative care per CRS team   - Tolerating regular diet, continue to encourage oral hydration/ mobilization    Acute blood loss anemia  - Hgb 6.1 S/P 2 units PRBCs 11/6  - Hgb stabilizing (dropped slightly today)  - Continue home PO iron supplement  - H/H in am. Continue off IVFs     SHAMIR on CKD III  - s/p IVFs- creatinine back to baseline  - Urine output adequate      Leukocytosis, improving  - Likely post-reactive but does also have urine culture (+) lactobacillus species  - This is normal  richard for women and it is unclear if this is pathogenic but treated empirically with 3 days unasyn     Essential hypertension  - BP meds held 2/2 SHAMIR/ lower bp  --restart BP meds if/ when BP allows     Hyperlipidemia  - Continue home statin      GERD  - Continue home meds. Stable.    DVT Prophylaxis: SCDs - hold Lovenox for now   Disposition: I expect the patient to be discharged home per CRS ok    CODE STATUS:   Code Status and Medical Interventions:   Ordered at: 11/04/19 1929     Code Status:    CPR     Medical Interventions (Level of Support Prior to Arrest):    Full     Electronically signed by Dang Johnson, KRISTIAN, 11/08/19, 1:21 PM.

## 2019-11-08 NOTE — PROGRESS NOTES
Continued Stay Note  [facility]     Patient Name: Jessica Durham  MRN: [id number]  Today's Date: 11/8/2019    Admit Date: 11/4/2019    Discharge Plan     Row Name 11/08/19 1406       Plan    Plan  Home vs home with HH    Patient/Family in Agreement with Plan  yes    Plan Comments  I spoke with the pt. PT states no skilled PT needed at DC at this time. Walker 430ft. The pt may be interested in HH SN for ostomy care asist. The family will review HH options in [place] and let CM know on Monday        Discharge Codes    No documentation.       Expected Discharge Date and Time     Expected Discharge Date Expected Discharge Time    Nov 11, 2019             Kellee Aleman RN

## 2019-11-08 NOTE — PLAN OF CARE
Problem: Patient Care Overview  Goal: Plan of Care Review  Outcome: Ongoing (interventions implemented as appropriate)   11/08/19 0820   Coping/Psychosocial   Plan of Care Reviewed With patient;other (see comments)  (Selene METCALF)   Plan of Care Review   Progress improving   OTHER   Outcome Summary WOC nurse f/u for colostomy. Stoma red and moist. Appliance intact with no leakage. Small amount creamy brown stool in pouch. Encouraged ambulation and fluids. WOC nurse will f/u. Please contact WOC nurse as needed for concerns.

## 2019-11-09 VITALS
RESPIRATION RATE: 18 BRPM | BODY MASS INDEX: 29.22 KG/M2 | SYSTOLIC BLOOD PRESSURE: 134 MMHG | TEMPERATURE: 98.3 F | HEIGHT: 62 IN | OXYGEN SATURATION: 98 % | WEIGHT: 158.8 LBS | HEART RATE: 70 BPM | DIASTOLIC BLOOD PRESSURE: 71 MMHG

## 2019-11-09 PROBLEM — G47.34 NOCTURNAL HYPOXIA: Status: ACTIVE | Noted: 2019-11-09

## 2019-11-09 PROBLEM — N17.9 ACUTE RENAL FAILURE SUPERIMPOSED ON STAGE 3 CHRONIC KIDNEY DISEASE (HCC): Status: RESOLVED | Noted: 2019-11-05 | Resolved: 2019-11-09

## 2019-11-09 PROBLEM — N18.30 ACUTE RENAL FAILURE SUPERIMPOSED ON STAGE 3 CHRONIC KIDNEY DISEASE (HCC): Status: RESOLVED | Noted: 2019-11-05 | Resolved: 2019-11-09

## 2019-11-09 LAB
ANION GAP SERPL CALCULATED.3IONS-SCNC: 10 MMOL/L (ref 5–15)
BUN BLD-MCNC: 17 MG/DL (ref 8–23)
BUN/CREAT SERPL: 13.5 (ref 7–25)
CALCIUM SPEC-SCNC: 8.7 MG/DL (ref 8.6–10.5)
CHLORIDE SERPL-SCNC: 109 MMOL/L (ref 98–107)
CO2 SERPL-SCNC: 23 MMOL/L (ref 22–29)
CREAT BLD-MCNC: 1.26 MG/DL (ref 0.57–1)
GFR SERPL CREATININE-BSD FRML MDRD: 41 ML/MIN/1.73
GLUCOSE BLD-MCNC: 106 MG/DL (ref 65–99)
HCT VFR BLD AUTO: 28.3 % (ref 34–46.6)
HGB BLD-MCNC: 8.9 G/DL (ref 12–15.9)
POTASSIUM BLD-SCNC: 4.4 MMOL/L (ref 3.5–5.2)
SODIUM BLD-SCNC: 142 MMOL/L (ref 136–145)

## 2019-11-09 PROCEDURE — 85014 HEMATOCRIT: CPT | Performed by: NURSE PRACTITIONER

## 2019-11-09 PROCEDURE — 85018 HEMOGLOBIN: CPT | Performed by: NURSE PRACTITIONER

## 2019-11-09 PROCEDURE — 99239 HOSP IP/OBS DSCHRG MGMT >30: CPT | Performed by: NURSE PRACTITIONER

## 2019-11-09 PROCEDURE — 80048 BASIC METABOLIC PNL TOTAL CA: CPT | Performed by: NURSE PRACTITIONER

## 2019-11-09 RX ADMIN — NYSTATIN: 100000 POWDER TOPICAL at 09:48

## 2019-11-09 RX ADMIN — PANTOPRAZOLE SODIUM 40 MG: 40 TABLET, DELAYED RELEASE ORAL at 06:10

## 2019-11-09 NOTE — PLAN OF CARE
Problem: Patient Care Overview  Goal: Plan of Care Review  Outcome: Ongoing (interventions implemented as appropriate)   11/09/19 0511   Coping/Psychosocial   Plan of Care Reviewed With patient   Plan of Care Review   Progress improving   OTHER   Outcome Summary VSS, pt ambulated in the hallway, c/o mild abdominal pain but refused pain medication. denies n/v, adequate UOP and stool output . will continue to monitor.        Problem: Fall Risk (Adult)  Goal: Absence of Fall  Outcome: Ongoing (interventions implemented as appropriate)   11/09/19 0511   Fall Risk (Adult)   Absence of Fall making progress toward outcome

## 2019-11-09 NOTE — DISCHARGE PLACEMENT REQUEST
"Jessica Durham (74 y.o. Female)     Artie Martinez RN   [phone]      Date of Birth Social Security Number Address Home Phone MRN    1945  [address] [place], [place] [address] [phone] [id number]    Denominational Marital Status          [employer]        Admission Date Admission Type Admitting Provider Attending Provider Department, Room/Bed    11/4/19 Elective Don Moore MD Svetich, David J, MD [address], S555/1    Discharge Date Discharge Disposition Discharge Destination         Home or Self Care              Attending Provider:  Don Moore MD    Allergies:  No Known Allergies    Isolation:  None   Infection:  None   Code Status:  CPR    Ht:  157.5 cm (62.01\")   Wt:  72 kg (158 lb 12.8 oz)    Admission Cmt:  None   Principal Problem:  S/P partial colectomy [Z90.49]                 Active Insurance as of 11/4/2019     Primary Coverage     Payor Plan Insurance Group Employer/Plan Group    HUMANA MEDICARE REPLACEMENT HUMANA MEDICARE REPL [id number]     Payor Plan Address Payor Plan Phone Number Payor Plan Fax Number Effective Dates    [address] [address] [phone]  1/1/2018 - None Entered    [facility] [address]       Subscriber Name Subscriber Birth Date Member ID       JESSICA DURHAM 1945 [id number]                 Emergency Contacts      (Rel.) Home Phone Work Phone Mobile Phone    Evan Durham (Spouse) -- -- [phone]            Problem List           Codes Noted - Resolved       Hospital    Nocturnal hypoxia ICD-10-CM: G47.34  ICD-9-CM: 327.24 11/9/2019 - Present    Essential hypertension ICD-10-CM: I10  ICD-9-CM: 401.9 11/5/2019 - Present    Hyperlipidemia ICD-10-CM: E78.5  ICD-9-CM: 272.4 11/5/2019 - Present    GERD (gastroesophageal reflux disease) ICD-10-CM: K21.9  ICD-9-CM: 530.81 11/5/2019 - Present    Anemia ICD-10-CM: D64.9  ICD-9-CM: 285.9 11/5/2019 - Present    Vaginal prolapse ICD-10-CM: N81.10  ICD-9-CM: 618.00 " 11/5/2019 - Present    S/P colostomy (CMS/HCC) ICD-10-CM: Z93.3  ICD-9-CM: V44.3 11/5/2019 - Present    * (Principal) S/P partial colectomy ICD-10-CM: Z90.49  ICD-9-CM: V45.89 11/5/2019 - Present    Rectal prolapse ICD-10-CM: K62.3  ICD-9-CM: 569.1 11/4/2019 - Present       Non-Hospital    CKD (chronic kidney disease), stage III (CMS/HCC) ICD-10-CM: N18.3  ICD-9-CM: 585.3 11/5/2019 - Present             History & Physical      Don Moore MD at 11/04/19 1332          [facility] Pre-op    Full history and physical note from office is up to date.  See office note attached.  Afebrile HR 99 O2 94% Bp 149/82  LAB Results:  Lab Results   Component Value Date    WBC 6.54 10/30/2019    HGB 10.9 (L) 10/30/2019    HCT 35.0 10/30/2019    MCV 97.0 10/30/2019     10/30/2019    GLUCOSE 90 10/30/2019    BUN 14 10/30/2019    CREATININE 1.22 (H) 10/30/2019    EGFRIFNONA 43 (L) 10/30/2019     10/30/2019    K 4.2 10/30/2019     10/30/2019    CO2 24.0 10/30/2019    CALCIUM 9.4 10/30/2019       Cancer Staging (if applicable)  Cancer Patient: __ yes _x_no __unknown__N/A; If yes, clinical stage T:__ N:__M:__, stage group or __N/A    Roseline Alcala, KRISTIAN 11/4/2019 1:32 PM    Electronically signed by Don Moore MD at 11/04/19 1505   Source Note         Scan on 10/24/2019: [facility] 10/24/2019 (below)            Electronically signed by Interface, Scans Incoming at 11/01/19 1335             H&P filed by [employer] Onbase, [place] at 11/01/19 5142     Scan on 11/1/2019: AMPARO VILLATORO MD 11/01/2019 (below)            Electronically signed by Interface, Scans Incoming at 11/01/19 2102     H&P filed by [employer] On[employer], [facility] at 11/01/19 1335     Scan on 10/24/2019: [facility] 10/24/2019 (below)            Electronically signed by Interface, Scans Incoming at 11/01/19 1335          Discharge Summary      Dang Johnson, APRN at 11/09/19 1015              [facility]  Services  DISCHARGE SUMMARY    Patient Name: Jessica Durham  : 1945  MRN: [id number]    Date of Admission: 2019  Date of Discharge: 2019  Primary Care Physician: Isabella Munroe MD    Consults     Date and Time Order Name Status Description    2019 1929 Inpatient Hospitalist Consult Completed           Hospital Course     Presenting Problem:   Rectal prolapse [K62.3]    Active Hospital Problems    Diagnosis  POA   • **S/P partial colectomy [Z90.49]  Not Applicable   • Nocturnal hypoxia [G47.34]  Unknown   • Essential hypertension [I10]  Yes   • Hyperlipidemia [E78.5]  Yes   • GERD (gastroesophageal reflux disease) [K21.9]  Yes   • Anemia [D64.9]  Yes   • Vaginal prolapse [N81.10]  Yes   • S/P colostomy (CMS/HCC) [Z93.3]  Not Applicable   • Rectal prolapse [K62.3]  Yes      Resolved Hospital Problems    Diagnosis Date Resolved POA   • Acute renal failure superimposed on stage 3 chronic kidney disease (CMS/HCC) [N17.9, N18.3] 2019 Clinically Undetermined          Hospital Course:  Jessica Durham is a 74 y.o. female with PMH significant for HTN, HLD, CKD III, anemia of chronic disease, GERD, gout and arthritis. She has been followed by Dr. Moore for rectal and vaginal prolapse with mara incontinence. She was admitted to [facility] on 2019 for elective proctosigmoid colectomy, colostomy, appendectomy, vaginopexy with intrinsic bridge, umbilical hernia repair and omentum flap by Dr. Moore. She tolerated the procedure well. Hospital medicine consulted postoperatively for medical management.     Wrist surgery, patient did have acute blood loss anemia and was transfused 2 units PRBCs.  He was monitored for signs and symptoms of bleeding postoperatively.  No signs or symptoms of bleeding have been found and hemoglobin has remained stable post transfusion.  He also had noted acute on chronic renal insufficiency.  IV fluids were started and have now been  discontinued as this has resolved.  Patient tolerating oral diet without difficulty.  Pain is well controlled.     She did have noted leukocytosis as resolved.  Likely reactive from surgery however urine culture positive with lactobacillus species.  She was empirically treated with 3 days of Unasyn and discontinued.  She remains afebrile and doing well postoperatively.  We will continue to hold HCTZ/lisinopril as blood pressure still on the lower side post SHAMIR.  Recommend follow-up closely with PCP within the week for blood pressure check and possible resumption of this medication.    Home health has been ordered following discharge.  Patient will follow-up with colorectal surgery in 2 weeks.    Discharge Follow Up Recommendations for labs/diagnostics:  Continue to hold lisinopril/HCTZ until follow-up with primary care next week with blood pressure check.  Can also hold aspirin at this time  Follow-up with Dr. Moore in 2 weeks  Home health skilled nursing ordered prior to discharge  Patient has noted nocturnal hypoxia during hospitalization.  Recommend an outpatient sleep study. referral has been sent to the sleep center.    Day of Discharge     HPI:   Garcia states she is feeling well.  Was able to walk 5 times in the hallway yesterday.  Tolerating diet and eating all of meals without nausea or vomiting.  Denies fever, chills, further signs or symptoms of bleeding.  States she feels well and anxious to go home.    Review of Systems  Gen- No fevers, chills  CV- No chest pain, palpitations  Resp- No cough, dyspnea  GI- No N/V/D, abd pain        Otherwise ROS is negative except as mentioned in the HPI.    Vital Signs:   Temp:  [98.3 °F (36.8 °C)-98.7 °F (37.1 °C)] 98.3 °F (36.8 °C)  Heart Rate:  [] 70  Resp:  [18] 18  BP: (134-135)/(71-77) 134/71     Physical Exam:  Constitutional: No acute distress, awake, alert  HENT: NCAT, mucous membranes moist  Respiratory: Clear to auscultation bilaterally, respiratory  effort normal   Cardiovascular: RRR, no murmurs, rubs, or gallops, palpable pedal pulses bilaterally  Gastrointestinal: Positive bowel sounds, soft, approp surgical tenderness, midline bandage CDI- ostomy with brown liquid stool. Stoma pink and moist- no s/s bleeding  Musculoskeletal: No bilateral ankle edema  Psychiatric: Appropriate affect, cooperative  Neurologic: Oriented x 3, strength symmetric in all extremities, Cranial Nerves grossly intact to confrontation, speech clear  Skin: No rashes      Pertinent  and/or Most Recent Results     Results from last 7 days   Lab Units 11/09/19  0507 11/08/19  0512 11/07/19  1402 11/07/19  0621 11/07/19  0548 11/06/19  2253 11/06/19  1343 11/06/19  0619 11/05/19  1351 11/05/19  0915  11/04/19  1358   WBC 10*3/mm3  --  10.09  --   --  10.67  --   --  11.05*  --  14.73*  --   --    HEMOGLOBIN g/dL 8.9* 8.6* 9.4*  --  9.9* 9.2* 6.2* 6.1*  --  9.2*   < >  --    HEMATOCRIT % 28.3* 27.4* 29.2*  --  31.1* 28.5* 20.5* 19.5*  --  30.2*   < >  --    PLATELETS 10*3/mm3  --  174  --   --  197  --   --  189  --  286  --   --    SODIUM mmol/L 142 137  --  136  --   --   --  134* 134* 134*  --   --    POTASSIUM mmol/L 4.4 4.6  --  4.6  --   --   --  4.6 4.7 4.8  --  3.9   CHLORIDE mmol/L 109* 107  --  108*  --   --   --  105 103 100  --   --    CO2 mmol/L 23.0 22.0  --  19.0*  --   --   --  19.0* 17.0* 20.0*  --   --    BUN mg/dL 17 20  --  25*  --   --   --  24* 23 22  --   --    CREATININE mg/dL 1.26* 1.40*  --  1.86*  --   --   --  2.23* 2.37* 2.28*  --   --    GLUCOSE mg/dL 106* 94  --  68  --   --   --  85 152* 150*  --   --    CALCIUM mg/dL 8.7 8.3*  --  8.4*  --   --   --  7.8* 8.2* 8.7  --   --     < > = values in this interval not displayed.           Invalid input(s): PROT, LABALBU        Invalid input(s): TG, LDLCALC, LDLREALC        Brief Urine Lab Results  (Last result in the past 365 days)      Color   Clarity   Blood   Leuk Est   Nitrite   Protein   CREAT   Urine HCG         10/30/19 1230 Yellow Cloudy Trace Large (3+) Negative Negative               Microbiology Results Abnormal     None          Imaging Results (All)     None                           Discharge Details        Discharge Medications      Changes to Medications      Instructions Start Date   lisinopril 10 MG tablet 10 mg, hydrochlorothiazide 12.5 MG 12.5 mg  What changed:  additional instructions   1 dose, Oral, Every 24 Hours Scheduled, HOLD MEDICATION UNTIL FOLLOW UP WITH PCP AND BP CHECK         Continue These Medications      Instructions Start Date   allopurinol 300 MG tablet  Commonly known as:  ZYLOPRIM   150 mg, Oral, Daily      CENTRUM SILVER ADULT 50+ PO   1 tablet, Oral, Daily      docusate sodium 100 MG capsule  Commonly known as:  COLACE   200 mg, Oral, 2 Times Daily      esomeprazole 20 MG capsule  Commonly known as:  nexIUM   20 mg, Oral, Every Morning Before Breakfast      ferrous sulfate 324 (65 Fe) MG tablet delayed-release EC tablet   324 mg, Oral, Daily With Breakfast      simvastatin 20 MG tablet  Commonly known as:  ZOCOR   20 mg, Oral, Nightly      ZYRTEC ALLERGY 10 MG capsule  Generic drug:  Cetirizine HCl   10 mg, Oral, Every Evening         Stop These Medications    aspirin 81 MG chewable tablet            No Known Allergies      Discharge Disposition:  Home or Self Care    Diet:  Hospital:  Diet Order   Procedures   • Diet Regular; GI Soft       Discharge Activity:   Activity Instructions     Activity as Tolerated              CODE STATUS:    Code Status and Medical Interventions:   Ordered at: 11/04/19 1929     Code Status:    CPR     Medical Interventions (Level of Support Prior to Arrest):    Full         No future appointments.    Additional Instructions for the Follow-ups that You Need to Schedule     Discharge Follow-up with PCP   As directed       Currently Documented PCP:    Isabella Munroe MD    PCP Phone Number:    [phone]     Follow Up Details:  next week- BP check          Discharge Follow-up with Specified Provider: Dr. Moore; 2 Weeks   As directed      To:  Dr. Moore    Follow Up:  2 Weeks         Referral to Home Health   As directed      Patient requests [facility]    Order Comments:  Patient requests [facility]     Face to Face Visit Date:  11/9/2019    Follow-up provider for Plan of Care?:  I treated the patient in an acute care facility and will not continue treatment after discharge.    Follow-up provider:  AMPARO VILLATORO [[id number]]    Reason/Clinical Findings:  ostomy care    Describe mobility limitations that make leaving home difficult:  post surgical/ wound and ostomy care    Nursing/Therapeutic Services Requested:  Skilled Nursing    Skilled nursing orders:  Wound care dressing/changes Ostomy instruction    Frequency:  1 Week 1               Time Spent on Discharge: 40 minutes    Electronically signed by KRISTIAN Zimmerman, 11/09/19, 10:15 AM.        Electronically signed by Dang Johnson APRN at 11/09/19 1023

## 2019-11-09 NOTE — DISCHARGE PLACEMENT REQUEST
"Jessica Durham (74 y.o. Female)     Date of Birth Social Security Number Address Home Phone MRN    1945  [address] [place] [address] [phone] [id number]    Jewish Marital Status          Protestant        Admission Date Admission Type Admitting Provider Attending Provider Department, Room/Bed    19 Elective Don Moore MD Svetich, David J, MD [address], S555/1    Discharge Date Discharge Disposition Discharge Destination         Home or Self Care              Attending Provider:  Don Moore MD    Allergies:  No Known Allergies    Isolation:  None   Infection:  None   Code Status:  CPR    Ht:  157.5 cm (62.01\")   Wt:  72 kg (158 lb 12.8 oz)    Admission Cmt:  None   Principal Problem:  S/P partial colectomy [Z90.49]                 Active Insurance as of 2019     Primary Coverage     Payor Plan Insurance Group Employer/Plan Group    HUMANA MEDICARE REPLACEMENT HUMANA MEDICARE REPL [id number]     Payor Plan Address Payor Plan Phone Number Payor Plan Fax Number Effective Dates    [address] [address] [phone]  2018 - None Entered    [facility] [id number]       Subscriber Name Subscriber Birth Date Member ID       JESSICA DURHAM 1945 [id number]                 Emergency Contacts      (Rel.) Home Phone Work Phone Mobile Phone    Evan Durham (Spouse) -- -- [phone]        [address] [address]  Phone:  [phone]  Fax:   Date: 2019      Referral to Home Health     Patient:  Jessica Durham MRN:  [id number]   [address] [facility] [address] :  1945  SSN:    Phone: [phone] Sex:  F      INSURANCE PAYOR PLAN GROUP # SUBSCRIBER ID   Primary:    HUMANA MEDICARE REPLACEMENT [id number]      Referring Provider Information:  CAMMIE GILLIS Phone: [phone] Fax:       Referral Information:   # Visits:  1 " Referral Type: Home Health [42]   Urgency:  Routine Referral Reason: Specialty Services Required   Start Date: 2019 End Date:  To be determined by Insurer   Diagnosis: S/P colostomy (CMS/[facility]) (Z93.3 [ICD-10-CM] V44.3 [ICD-9-CM])      Refer to Dept:   Refer to Provider:   Refer to Facility:    Patient requests [facility]  Face to Face Visit Date: 2019  Follow-up provider for Plan of Care? I treated the patient in an acute care facility and will not continue treatment after discharge.  Follow-up provider: ISABELLA MUNROE [[id number]]  Reason/Clinical Findings: ostomy care  Describe mobility limitations that make leaving home difficult: post surgical/ wound and ostomy care  Nursing/Therapeutic Services Requested: Skilled Nursing  Skilled nursing orders: Wound care dressing/changes  Skilled nursing orders: Ostomy instruction  Frequency: 1 Week 1     This document serves as a request of services and does not constitute Insurance authorization or approval of services.  To determine eligibility, please contact the members Insurance carrier to verify and review coverage.     If you have medical questions regarding this request for services. Please contact [address] at [phone] during normal business hours.       Authorizing Provider:Dang Johnson APRN  Authorizing Provider's NPI: [id number]  Order Entered By: Dang Johnson APRN 2019 10:15 AM     Electronically signed by: Dang Johnson APRN 2019 10:15 AM               Discharge Summary      Dang Johnson APRN at 19 1015              [facility] Medicine Services  DISCHARGE SUMMARY    Patient Name: Jessica Durham  : 1945  MRN: [id number]    Date of Admission: 2019  Date of Discharge: 2019  Primary Care Physician: Isabella Munroe MD    Consults     Date and Time Order Name Status Description    2019 1929 Inpatient Hospitalist Consult  Completed           Hospital Course     Presenting Problem:   Rectal prolapse [K62.3]    Active Hospital Problems    Diagnosis  POA   • **S/P partial colectomy [Z90.49]  Not Applicable   • Nocturnal hypoxia [G47.34]  Unknown   • Essential hypertension [I10]  Yes   • Hyperlipidemia [E78.5]  Yes   • GERD (gastroesophageal reflux disease) [K21.9]  Yes   • Anemia [D64.9]  Yes   • Vaginal prolapse [N81.10]  Yes   • S/P colostomy (CMS/[facility]) [Z93.3]  Not Applicable   • Rectal prolapse [K62.3]  Yes      Resolved Hospital Problems    Diagnosis Date Resolved POA   • Acute renal failure superimposed on stage 3 chronic kidney disease (CMS/HCC) [N17.9, N18.3] 11/09/2019 Clinically Undetermined          Hospital Course:  Jessica Durham is a 74 y.o. female with PMH significant for HTN, HLD, CKD III, anemia of chronic disease, GERD, gout and arthritis. She has been followed by Dr. Moore for rectal and vaginal prolapse with mara incontinence. She was admitted to [facility] on 11/4/2019 for elective proctosigmoid colectomy, colostomy, appendectomy, vaginopexy with intrinsic bridge, umbilical hernia repair and omentum flap by Dr. Moore. She tolerated the procedure well. Hospital medicine consulted postoperatively for medical management.     Wrist surgery, patient did have acute blood loss anemia and was transfused 2 units PRBCs.  He was monitored for signs and symptoms of bleeding postoperatively.  No signs or symptoms of bleeding have been found and hemoglobin has remained stable post transfusion.  He also had noted acute on chronic renal insufficiency.  IV fluids were started and have now been discontinued as this has resolved.  Patient tolerating oral diet without difficulty.  Pain is well controlled.     She did have noted leukocytosis as resolved.  Likely reactive from surgery however urine culture positive with lactobacillus species.  She was empirically treated with 3 days of Unasyn and discontinued.  She  remains afebrile and doing well postoperatively.  We will continue to hold HCTZ/lisinopril as blood pressure still on the lower side post SHAMIR.  Recommend follow-up closely with PCP within the week for blood pressure check and possible resumption of this medication.    Home health has been ordered following discharge.  Patient will follow-up with colorectal surgery in 2 weeks.    Discharge Follow Up Recommendations for labs/diagnostics:  Continue to hold lisinopril/HCTZ until follow-up with primary care next week with blood pressure check.  Can also hold aspirin at this time  Follow-up with Dr. Moore in 2 weeks  Home health skilled nursing ordered prior to discharge  Patient has noted nocturnal hypoxia during hospitalization.  Recommend an outpatient sleep study. referral has been sent to the sleep center.    Day of Discharge     HPI:   Garcia states she is feeling well.  Was able to walk 5 times in the hallway yesterday.  Tolerating diet and eating all of meals without nausea or vomiting.  Denies fever, chills, further signs or symptoms of bleeding.  States she feels well and anxious to go home.    Review of Systems  Gen- No fevers, chills  CV- No chest pain, palpitations  Resp- No cough, dyspnea  GI- No N/V/D, abd pain        Otherwise ROS is negative except as mentioned in the HPI.    Vital Signs:   Temp:  [98.3 °F (36.8 °C)-98.7 °F (37.1 °C)] 98.3 °F (36.8 °C)  Heart Rate:  [] 70  Resp:  [18] 18  BP: (134-135)/(71-77) 134/71     Physical Exam:  Constitutional: No acute distress, awake, alert  HENT: NCAT, mucous membranes moist  Respiratory: Clear to auscultation bilaterally, respiratory effort normal   Cardiovascular: RRR, no murmurs, rubs, or gallops, palpable pedal pulses bilaterally  Gastrointestinal: Positive bowel sounds, soft, approp surgical tenderness, midline bandage CDI- ostomy with brown liquid stool. Stoma pink and moist- no s/s bleeding  Musculoskeletal: No bilateral ankle edema  Psychiatric:  Appropriate affect, cooperative  Neurologic: Oriented x 3, strength symmetric in all extremities, Cranial Nerves grossly intact to confrontation, speech clear  Skin: No rashes      Pertinent  and/or Most Recent Results     Results from last 7 days   Lab Units 11/09/19  0507 11/08/19  0512 11/07/19  1402 11/07/19  0621 11/07/19  0548 11/06/19  2253 11/06/19  1343 11/06/19  0619 11/05/19  1351 11/05/19  0915  11/04/19  1358   WBC 10*3/mm3  --  10.09  --   --  10.67  --   --  11.05*  --  14.73*  --   --    HEMOGLOBIN g/dL 8.9* 8.6* 9.4*  --  9.9* 9.2* 6.2* 6.1*  --  9.2*   < >  --    HEMATOCRIT % 28.3* 27.4* 29.2*  --  31.1* 28.5* 20.5* 19.5*  --  30.2*   < >  --    PLATELETS 10*3/mm3  --  174  --   --  197  --   --  189  --  286  --   --    SODIUM mmol/L 142 137  --  136  --   --   --  134* 134* 134*  --   --    POTASSIUM mmol/L 4.4 4.6  --  4.6  --   --   --  4.6 4.7 4.8  --  3.9   CHLORIDE mmol/L 109* 107  --  108*  --   --   --  105 103 100  --   --    CO2 mmol/L 23.0 22.0  --  19.0*  --   --   --  19.0* 17.0* 20.0*  --   --    BUN mg/dL 17 20  --  25*  --   --   --  24* 23 22  --   --    CREATININE mg/dL 1.26* 1.40*  --  1.86*  --   --   --  2.23* 2.37* 2.28*  --   --    GLUCOSE mg/dL 106* 94  --  68  --   --   --  85 152* 150*  --   --    CALCIUM mg/dL 8.7 8.3*  --  8.4*  --   --   --  7.8* 8.2* 8.7  --   --     < > = values in this interval not displayed.           Invalid input(s): PROT, LABALBU        Invalid input(s): TG, LDLCALC, LDLREALC        Brief Urine Lab Results  (Last result in the past 365 days)      Color   Clarity   Blood   Leuk Est   Nitrite   Protein   CREAT   Urine HCG        10/30/19 1230 Yellow Cloudy Trace Large (3+) Negative Negative               Microbiology Results Abnormal     None          Imaging Results (All)     None                           Discharge Details        Discharge Medications      Changes to Medications      Instructions Start Date   lisinopril 10 MG tablet 10 mg,  hydrochlorothiazide 12.5 MG 12.5 mg  What changed:  additional instructions   1 dose, Oral, Every 24 Hours Scheduled, HOLD MEDICATION UNTIL FOLLOW UP WITH PCP AND BP CHECK         Continue These Medications      Instructions Start Date   allopurinol 300 MG tablet  Commonly known as:  ZYLOPRIM   150 mg, Oral, Daily      CENTRUM SILVER ADULT 50+ PO   1 tablet, Oral, Daily      docusate sodium 100 MG capsule  Commonly known as:  COLACE   200 mg, Oral, 2 Times Daily      esomeprazole 20 MG capsule  Commonly known as:  nexIUM   20 mg, Oral, Every Morning Before Breakfast      ferrous sulfate 324 (65 Fe) MG tablet delayed-release EC tablet   324 mg, Oral, Daily With Breakfast      simvastatin 20 MG tablet  Commonly known as:  ZOCOR   20 mg, Oral, Nightly      ZYRTEC ALLERGY 10 MG capsule  Generic drug:  Cetirizine HCl   10 mg, Oral, Every Evening         Stop These Medications    aspirin 81 MG chewable tablet            No Known Allergies      Discharge Disposition:  Home or Self Care    Diet:  Hospital:  Diet Order   Procedures   • Diet Regular; GI Soft       Discharge Activity:   Activity Instructions     Activity as Tolerated              CODE STATUS:    Code Status and Medical Interventions:   Ordered at: 11/04/19 1929     Code Status:    CPR     Medical Interventions (Level of Support Prior to Arrest):    Full         No future appointments.    Additional Instructions for the Follow-ups that You Need to Schedule     Discharge Follow-up with PCP   As directed       Currently Documented PCP:    Isabella Munroe MD    PCP Phone Number:    [phone]     Follow Up Details:  next week- BP check         Discharge Follow-up with Specified Provider: Dr. Moore; 2 Weeks   As directed      To:  Dr. Moore    Follow Up:  2 Weeks         Referral to Home Health   As directed      Patient requests [facility]    Order Comments:  Patient requests [facility]     Face to Face Visit Date:   11/9/2019    Follow-up provider for Plan of Care?:  I treated the patient in an acute care facility and will not continue treatment after discharge.    Follow-up provider:  AMPARO VILLATORO [[id number]]    Reason/Clinical Findings:  ostomy care    Describe mobility limitations that make leaving home difficult:  post surgical/ wound and ostomy care    Nursing/Therapeutic Services Requested:  Skilled Nursing    Skilled nursing orders:  Wound care dressing/changes Ostomy instruction    Frequency:  1 Week 1               Time Spent on Discharge: 40 minutes    Electronically signed by KRISTIAN Zimmerman, 11/09/19, 10:15 AM.        Electronically signed by Dang Johnson APRN at 11/09/19 102

## 2019-11-09 NOTE — DISCHARGE SUMMARY
[facility] Medicine Services  DISCHARGE SUMMARY    Patient Name: Jessica Durham  : 1945  MRN: [id number]    Date of Admission: 2019  Date of Discharge: 2019  Primary Care Physician: Isabella Munroe MD    Consults     Date and Time Order Name Status Description    2019 1929 Inpatient Hospitalist Consult Completed           Hospital Course     Presenting Problem:   Rectal prolapse [K62.3]    Active Hospital Problems    Diagnosis  POA   • **S/P partial colectomy [Z90.49]  Not Applicable   • Nocturnal hypoxia [G47.34]  Unknown   • Essential hypertension [I10]  Yes   • Hyperlipidemia [E78.5]  Yes   • GERD (gastroesophageal reflux disease) [K21.9]  Yes   • Anemia [D64.9]  Yes   • Vaginal prolapse [N81.10]  Yes   • S/P colostomy (CMS/HCC) [Z93.3]  Not Applicable   • Rectal prolapse [K62.3]  Yes      Resolved Hospital Problems    Diagnosis Date Resolved POA   • Acute renal failure superimposed on stage 3 chronic kidney disease (CMS/HCC) [N17.9, N18.3] 2019 Clinically Undetermined          Hospital Course:  Jessica Durham is a 74 y.o. female with PMH significant for HTN, HLD, CKD III, anemia of chronic disease, GERD, gout and arthritis. She has been followed by Dr. Moore for rectal and vaginal prolapse with mara incontinence. She was admitted to [facility] on 2019 for elective proctosigmoid colectomy, colostomy, appendectomy, vaginopexy with intrinsic bridge, umbilical hernia repair and omentum flap by Dr. Moore. She tolerated the procedure well. Hospital medicine consulted postoperatively for medical management.     Wrist surgery, patient did have acute blood loss anemia and was transfused 2 units PRBCs.  He was monitored for signs and symptoms of bleeding postoperatively.  No signs or symptoms of bleeding have been found and hemoglobin has remained stable post transfusion.  He also had noted acute on chronic renal insufficiency.  IV  fluids were started and have now been discontinued as this has resolved.  Patient tolerating oral diet without difficulty.  Pain is well controlled.     She did have noted leukocytosis as resolved.  Likely reactive from surgery however urine culture positive with lactobacillus species.  She was empirically treated with 3 days of Unasyn and discontinued.  She remains afebrile and doing well postoperatively.  We will continue to hold HCTZ/lisinopril as blood pressure still on the lower side post SHAMIR.  Recommend follow-up closely with PCP within the week for blood pressure check and possible resumption of this medication.    Home health has been ordered following discharge.  Patient will follow-up with colorectal surgery in 2 weeks.    Discharge Follow Up Recommendations for labs/diagnostics:  Continue to hold lisinopril/HCTZ until follow-up with primary care next week with blood pressure check.  Can also hold aspirin at this time  Follow-up with Dr. Moore in 2 weeks  Home health skilled nursing ordered prior to discharge  Patient has noted nocturnal hypoxia during hospitalization.  Recommend an outpatient sleep study. referral has been sent to the sleep center.    Day of Discharge     HPI:   Garcia states she is feeling well.  Was able to walk 5 times in the hallway yesterday.  Tolerating diet and eating all of meals without nausea or vomiting.  Denies fever, chills, further signs or symptoms of bleeding.  States she feels well and anxious to go home.    Review of Systems  Gen- No fevers, chills  CV- No chest pain, palpitations  Resp- No cough, dyspnea  GI- No N/V/D, abd pain        Otherwise ROS is negative except as mentioned in the HPI.    Vital Signs:   Temp:  [98.3 °F (36.8 °C)-98.7 °F (37.1 °C)] 98.3 °F (36.8 °C)  Heart Rate:  [] 70  Resp:  [18] 18  BP: (134-135)/(71-77) 134/71     Physical Exam:  Constitutional: No acute distress, awake, alert  HENT: NCAT, mucous membranes moist  Respiratory: Clear to  auscultation bilaterally, respiratory effort normal   Cardiovascular: RRR, no murmurs, rubs, or gallops, palpable pedal pulses bilaterally  Gastrointestinal: Positive bowel sounds, soft, approp surgical tenderness, midline bandage CDI- ostomy with brown liquid stool. Stoma pink and moist- no s/s bleeding  Musculoskeletal: No bilateral ankle edema  Psychiatric: Appropriate affect, cooperative  Neurologic: Oriented x 3, strength symmetric in all extremities, Cranial Nerves grossly intact to confrontation, speech clear  Skin: No rashes      Pertinent  and/or Most Recent Results     Results from last 7 days   Lab Units 11/09/19  0507 11/08/19  0512 11/07/19  1402 11/07/19  0621 11/07/19  0548 11/06/19  2253 11/06/19  1343 11/06/19  0619 11/05/19  1351 11/05/19  0915  11/04/19  1358   WBC 10*3/mm3  --  10.09  --   --  10.67  --   --  11.05*  --  14.73*  --   --    HEMOGLOBIN g/dL 8.9* 8.6* 9.4*  --  9.9* 9.2* 6.2* 6.1*  --  9.2*   < >  --    HEMATOCRIT % 28.3* 27.4* 29.2*  --  31.1* 28.5* 20.5* 19.5*  --  30.2*   < >  --    PLATELETS 10*3/mm3  --  174  --   --  197  --   --  189  --  286  --   --    SODIUM mmol/L 142 137  --  136  --   --   --  134* 134* 134*  --   --    POTASSIUM mmol/L 4.4 4.6  --  4.6  --   --   --  4.6 4.7 4.8  --  3.9   CHLORIDE mmol/L 109* 107  --  108*  --   --   --  105 103 100  --   --    CO2 mmol/L 23.0 22.0  --  19.0*  --   --   --  19.0* 17.0* 20.0*  --   --    BUN mg/dL 17 20  --  25*  --   --   --  24* 23 22  --   --    CREATININE mg/dL 1.26* 1.40*  --  1.86*  --   --   --  2.23* 2.37* 2.28*  --   --    GLUCOSE mg/dL 106* 94  --  68  --   --   --  85 152* 150*  --   --    CALCIUM mg/dL 8.7 8.3*  --  8.4*  --   --   --  7.8* 8.2* 8.7  --   --     < > = values in this interval not displayed.           Invalid input(s): PROT, LABALBU        Invalid input(s): TG, LDLCALC, LDLREALC        Brief Urine Lab Results  (Last result in the past 365 days)      Color   Clarity   Blood   Leuk Est    Nitrite   Protein   CREAT   Urine HCG        10/30/19 1230 Yellow Cloudy Trace Large (3+) Negative Negative               Microbiology Results Abnormal     None          Imaging Results (All)     None                           Discharge Details        Discharge Medications      Changes to Medications      Instructions Start Date   lisinopril 10 MG tablet 10 mg, hydrochlorothiazide 12.5 MG 12.5 mg  What changed:  additional instructions   1 dose, Oral, Every 24 Hours Scheduled, HOLD MEDICATION UNTIL FOLLOW UP WITH PCP AND BP CHECK         Continue These Medications      Instructions Start Date   allopurinol 300 MG tablet  Commonly known as:  ZYLOPRIM   150 mg, Oral, Daily      CENTRUM SILVER ADULT 50+ PO   1 tablet, Oral, Daily      docusate sodium 100 MG capsule  Commonly known as:  COLACE   200 mg, Oral, 2 Times Daily      esomeprazole 20 MG capsule  Commonly known as:  nexIUM   20 mg, Oral, Every Morning Before Breakfast      ferrous sulfate 324 (65 Fe) MG tablet delayed-release EC tablet   324 mg, Oral, Daily With Breakfast      simvastatin 20 MG tablet  Commonly known as:  ZOCOR   20 mg, Oral, Nightly      ZYRTEC ALLERGY 10 MG capsule  Generic drug:  Cetirizine HCl   10 mg, Oral, Every Evening         Stop These Medications    aspirin 81 MG chewable tablet            No Known Allergies      Discharge Disposition:  Home or Self Care    Diet:  Hospital:  Diet Order   Procedures   • Diet Regular; GI Soft       Discharge Activity:   Activity Instructions     Activity as Tolerated              CODE STATUS:    Code Status and Medical Interventions:   Ordered at: 11/04/19 1929     Code Status:    CPR     Medical Interventions (Level of Support Prior to Arrest):    Full         No future appointments.    Additional Instructions for the Follow-ups that You Need to Schedule     Discharge Follow-up with PCP   As directed       Currently Documented PCP:    Isabella Munroe MD    PCP Phone Number:    [phone]      Follow Up Details:  next week- BP check         Discharge Follow-up with Specified Provider: Dr. Moore; 2 Weeks   As directed      To:  Dr. Moore    Follow Up:  2 Weeks         Referral to Home Health   As directed      Patient requests [facility]    Order Comments:  Patient requests [facility]     Face to Face Visit Date:  11/9/2019    Follow-up provider for Plan of Care?:  I treated the patient in an acute care facility and will not continue treatment after discharge.    Follow-up provider:  AMPARO VILLATORO [[id number]]    Reason/Clinical Findings:  ostomy care    Describe mobility limitations that make leaving home difficult:  post surgical/ wound and ostomy care    Nursing/Therapeutic Services Requested:  Skilled Nursing    Skilled nursing orders:  Wound care dressing/changes Ostomy instruction    Frequency:  1 Week 1               Time Spent on Discharge: 40 minutes    Electronically signed by KRISTIAN Zimmerman, 11/09/19, 10:15 AM.

## 2019-11-09 NOTE — PROGRESS NOTES
Case Management Discharge Note    Final Note: Pt's plans are to dc to home with family and [facility]. Spoke with G[facility] regarding referral and faxed order. Family to transport.     Destination      No service has been selected for the patient.      Durable Medical Equipment      No service has been selected for the patient.      Dialysis/Infusion      No service has been selected for the patient.      Home Medical Care - Selection Complete      Service Provider Request Status Selected Services Address Phone Number Fax Number    [facility] Selected Home Health Services [address] [facility] [facility] [address] [phone]      Therapy      No service has been selected for the patient.      Community Resources      No service has been selected for the patient.             Final Discharge Disposition Code: 06 - home with home health care

## 2019-11-09 NOTE — PLAN OF CARE
"Problem: Patient Care Overview  Goal: Plan of Care Review  Outcome: Ongoing (interventions implemented as appropriate)   11/09/19 3842   Coping/Psychosocial   Plan of Care Reviewed With patient;spouse   Plan of Care Review   Progress improving   OTHER   Outcome Summary Appliance change and follow-up stomal education performed. Spouse performed appliance change with little to no guidance needed. Stoma looks great and peristomal skin is intact. Placed Denio New image 2 3/4\" cut at 35mm. Discharge supplies provided and reviewed stomal instructions. Thanks          "

## 2019-11-10 ENCOUNTER — READMISSION MANAGEMENT (OUTPATIENT)
Dept: CALL CENTER | Facility: HOSPITAL | Age: 74
End: 2019-11-10

## 2019-11-10 NOTE — OUTREACH NOTE
Prep Survey      Responses   Facility patient discharged from?  [place]   Is patient eligible?  Yes   Discharge diagnosis  S/P partial colectomy    Rectal prolapse    Does the patient have one of the following disease processes/diagnoses(primary or secondary)?  General Surgery   Does the patient have Home health ordered?  Yes   What is the Home health agency?   [facility].    Is there a DME ordered?  No   Prep survey completed?  Yes          Ana Maria Sanchez RN

## 2019-11-14 ENCOUNTER — READMISSION MANAGEMENT (OUTPATIENT)
Dept: CALL CENTER | Facility: HOSPITAL | Age: 74
End: 2019-11-14

## 2019-11-14 NOTE — OUTREACH NOTE
General Surgery Week 1 Survey      Responses   Facility patient discharged from?  [place]   Does the patient have one of the following disease processes/diagnoses(primary or secondary)?  General Surgery   Is there a successful TCM telephone encounter documented?  No   Week 1 attempt successful?  No   Unsuccessful attempts  Attempt 1          Constance Sutton RN

## 2019-11-18 ENCOUNTER — READMISSION MANAGEMENT (OUTPATIENT)
Dept: CALL CENTER | Facility: HOSPITAL | Age: 74
End: 2019-11-18

## 2019-11-18 NOTE — OUTREACH NOTE
General Surgery Week 1 Survey      Responses   Facility patient discharged from?  [place]   Does the patient have one of the following disease processes/diagnoses(primary or secondary)?  General Surgery   Is there a successful TCM telephone encounter documented?  No   Week 1 attempt successful?  No   Unsuccessful attempts  Attempt 2          Stephanie Payan RN

## 2019-11-21 ENCOUNTER — READMISSION MANAGEMENT (OUTPATIENT)
Dept: CALL CENTER | Facility: HOSPITAL | Age: 74
End: 2019-11-21

## 2019-11-21 NOTE — OUTREACH NOTE
General Surgery Week 2 Survey      Responses   Facility patient discharged from?  [facility]   Does the patient have one of the following disease processes/diagnoses(primary or secondary)?  General Surgery   Week 2 attempt successful?  Yes   Call start time  1227   Call end time  1229   Meds reviewed with patient/caregiver?  Yes   Is the patient having any side effects they believe may be caused by any medication additions or changes?  No   Does the patient have all medications related to this admission filled (includes all antibiotics, pain medications, etc.)  Yes   Is the patient taking all medications as directed (includes completed medication regime)?  Yes   Does the patient have a follow up appointment scheduled with their surgeon?  Yes   Has the patient kept scheduled appointments due by today?  Yes   What is the Home health agency?   [facility].    Psychosocial issues?  No   Comments  managing colostomy well, has many    Did the patient receive a copy of their discharge instructions?  Yes   Nursing interventions  Reviewed instructions with patient   What is the patient's perception of their health status since discharge?  Improving   Nursing interventions  Nurse provided patient education   Is the patient /caregiver able to teach back basic post-op care?  Take showers only when approved by MD-sponge bathe until then, No tub bath, swimming, or hot tub until instructed by MD, Keep incision areas clean,dry and protected, Do not remove steri-strips, Lifting as instructed by MD in discharge instructions   Is the patient/caregiver able to teach back signs and symptoms of incisional infection?  Increased redness, swelling or pain at the incisonal site, Increased drainage or bleeding, Incisional warmth, Pus or odor from incision   Is the patient/caregiver able to teach back steps to recovery at home?  Set small, achievable goals for return to baseline health   Is the patient/caregiver able to teach back the  hierarchy of who to call/visit for symptoms/problems? PCP, Specialist, Home health nurse, Urgent Care, ED, 911  Yes   Week 2 call completed?  Yes          Brooke Carver RN

## 2019-11-30 ENCOUNTER — READMISSION MANAGEMENT (OUTPATIENT)
Dept: CALL CENTER | Facility: HOSPITAL | Age: 74
End: 2019-11-30

## 2019-11-30 NOTE — OUTREACH NOTE
General Surgery Week 3 Survey      Responses   Facility patient discharged from?  [facility]   Does the patient have one of the following disease processes/diagnoses(primary or secondary)?  General Surgery   Week 3 attempt successful?  No   Unsuccessful attempts  Attempt 1          Philomena Roldan RN

## 2019-12-03 ENCOUNTER — READMISSION MANAGEMENT (OUTPATIENT)
Dept: CALL CENTER | Facility: HOSPITAL | Age: 74
End: 2019-12-03

## 2019-12-03 NOTE — OUTREACH NOTE
General Surgery Week 3 Survey      Responses   Facility patient discharged from?  [facility]   Does the patient have one of the following disease processes/diagnoses(primary or secondary)?  General Surgery   Week 3 attempt successful?  No   Unsuccessful attempts  Attempt 2          Alejandra Durham RN

## 2019-12-05 ENCOUNTER — HOSPITAL ENCOUNTER (OUTPATIENT)
Dept: WOUND CARE | Facility: HOSPITAL | Age: 74
Discharge: HOME | End: 2019-12-05
Admitting: COLON & RECTAL SURGERY

## 2019-12-05 PROCEDURE — G0463 HOSPITAL OUTPT CLINIC VISIT: HCPCS

## 2019-12-05 NOTE — NURSING NOTE
"Patient seen this morning for leaking colostomy appliance issues. Patient has been wearing a flat [place] new image. When patient is in a sitting position there is bilateral peristomal creasing seen. She just needs to wear a convexity appliance to prevent stool undermining. Placed her in a [place] convexity New image 2 1/4\" cut at 29mm. Peristomal skin was intact and dry. POC reviewed with patient and spouse. Provided some extra appliances to her to hold her over till she can get the right one ordered. Instructed patient to contact me if further needs or issues arise. Thanks   "

## 2019-12-23 ENCOUNTER — CONSULT (OUTPATIENT)
Dept: SLEEP MEDICINE | Facility: HOSPITAL | Age: 74
End: 2019-12-23

## 2019-12-23 VITALS
SYSTOLIC BLOOD PRESSURE: 133 MMHG | WEIGHT: 153 LBS | HEIGHT: 62 IN | BODY MASS INDEX: 28.16 KG/M2 | HEART RATE: 113 BPM | OXYGEN SATURATION: 95 % | DIASTOLIC BLOOD PRESSURE: 76 MMHG

## 2019-12-23 DIAGNOSIS — E66.3 OVERWEIGHT: ICD-10-CM

## 2019-12-23 DIAGNOSIS — G47.33 OBSTRUCTIVE SLEEP APNEA, ADULT: ICD-10-CM

## 2019-12-23 DIAGNOSIS — G47.34 NOCTURNAL HYPOXEMIA: ICD-10-CM

## 2019-12-23 DIAGNOSIS — R06.83 SNORING: Primary | ICD-10-CM

## 2019-12-23 PROCEDURE — 99204 OFFICE O/P NEW MOD 45 MIN: CPT | Performed by: INTERNAL MEDICINE

## 2019-12-23 RX ORDER — LOSARTAN POTASSIUM 50 MG/1
TABLET ORAL
COMMUNITY
Start: 2019-12-07

## 2019-12-23 NOTE — PROGRESS NOTES
Subjective   Jessica Durham is a 74 y.o. female is being seen for consultation today at the request of KRISTIAN Ro for the evaluation of snoring and possible sleep disordered breathing.    History of Present Illness  Patient had surgery in November for rectal prolapse.  She was noted while hospitalized to have nocturnal hypoxemia.  She is referred for evaluation of possible sleep disordered breathing.  She has had snoring noted for at least 10 years.  She denies being told that she had apneas.  She denies awakening gasping for breath.  She has awakened herself snoring.  She says she usually feels rested on arising in the morning.  She denies having morning headache.  She admits she occasionally will fall asleep if sitting quietly during the day.  She denies problems while driving.    She has a history of loud snoring.  She thinks is worse on her back.  She has awaken with a dry mouth.  She denies awakening coughing choking or with a sore throat.  She denies having trouble breathing through her nose or breaking her nose.  She has a history of reflux symptoms and is on medications.  She denies hypnagogue hallucinations or sleep paralysis.  She denies kicking or legs at night.  She does have some chronic low back pain.  She has lost about 8 pounds with her surgery.  She thinks she awakens about every 2 hours at night to go to the bathroom.    She goes to bed between 10 PM and 10:30 PM.  She will fall asleep fairly quickly.  She awakens 3-4 times during the night.  She thinks she gets 8 to 9 hours of sleep says she usually feels rested although occasionally not.  She has had hypertension on for 10 years.  She denies any history of diabetes coronary disease.  She does have a history of arthritis.  No Known Allergies       Current Outpatient Medications:   •  allopurinol (ZYLOPRIM) 300 MG tablet, Take 150 mg by mouth Daily., Disp: , Rfl:   •  aspirin 81 MG tablet, Take 81 mg by mouth 2 (Two) Times a Day.,  Disp: , Rfl:   •  Cetirizine HCl (ZYRTEC ALLERGY) 10 MG capsule, Take 10 mg by mouth Every Evening., Disp: , Rfl:   •  docusate sodium (COLACE) 100 MG capsule, Take 200 mg by mouth 2 (Two) Times a Day., Disp: , Rfl:   •  esomeprazole (nexIUM) 20 MG capsule, Take 20 mg by mouth Every Morning Before Breakfast., Disp: , Rfl:   •  ferrous sulfate 324 (65 Fe) MG tablet delayed-release EC tablet, Take 324 mg by mouth Daily With Breakfast., Disp: , Rfl:   •  losartan (COZAAR) 50 MG tablet, , Disp: , Rfl:   •  Multiple Vitamins-Minerals (CENTRUM SILVER ADULT 50+ PO), Take 1 tablet by mouth Daily., Disp: , Rfl:   •  Polyethylene Glycol 3350 (MIRALAX PO), Take  by mouth., Disp: , Rfl:   •  simvastatin (ZOCOR) 20 MG tablet, Take 20 mg by mouth Every Night., Disp: , Rfl:   No current facility-administered medications for this visit.     Facility-Administered Medications Ordered in Other Visits:   •  acetaminophen (TYLENOL) tablet 1,000 mg, 1,000 mg, Oral, Once, Don Moore MD  •  gabapentin (NEURONTIN) capsule 600 mg, 600 mg, Oral, Once, Don Moore MD  •  meloxicam (MOBIC) tablet 15 mg, 15 mg, Oral, Once, Don Moore MD    Social History    Tobacco Use      Smoking status: Former Smoker she says she smoked less than a pack per day for 10 years but quit 25 years ago      Smokeless tobacco: Never Used      Tobacco comment: over 30 years       Social History     Substance and Sexual Activity   Alcohol Use No   • Frequency: Never       Caffeine: She has 3 to 4 glasses of tea per day and may have 1-2 dee    Past Medical History:   Diagnosis Date   • Arthritis    • Gout    • Hyperlipidemia    • Hypertension    • Rectal prolapse    • Rectocele        Past Surgical History:   Procedure Laterality Date   • APPENDECTOMY     • BLADDER REPAIR     • COLON RESECTION N/A 11/4/2019    Procedure: PROCTOSIGMOID COLECTOMY, COLOSTOMY, APPENDECTOMY, REMOVAL OF PESSARY, VAGINOPEXY WITH INTRENSIC BRIDGE, UMBILICAL HERNIA REPAIR,  "AND OMENTUM FLAP;  Surgeon: Don Moore MD;  Location: [facility];  Service: General   • COLONOSCOPY     • EYE SURGERY      bilateral lens   • HYSTERECTOMY         Family History   Problem Relation Age of Onset   • Heart disease Mother    • Other Mother         heart attack   • Hypertension Mother    • Diabetes Father    • Stroke Father    • Thyroid disease Sister    • Diabetes Paternal Aunt    • Diabetes Paternal Uncle    • Cancer Sister         metastatic lung   Family history is also positive for COPD    The following portions of the patient's history were reviewed and updated as appropriate: allergies, current medications, past family history, past medical history, past social history, past surgical history and problem list.    Review of Systems   Constitutional: Negative.    HENT: Positive for hearing loss, postnasal drip and tinnitus.    Eyes: Positive for visual disturbance.   Respiratory: Positive for cough and wheezing.    Cardiovascular: Negative.    Gastrointestinal: Negative.    Endocrine: Negative.    Genitourinary: Positive for frequency.   Musculoskeletal: Positive for back pain and joint swelling.   Skin: Negative.    Allergic/Immunologic: Negative.    Neurological: Negative.    Hematological: Negative.    Psychiatric/Behavioral: Negative.    Providence score is 4/24    Objective     /76   Pulse 113   Ht 157.5 cm (62\")   Wt 69.4 kg (153 lb)   SpO2 95%   BMI 27.98 kg/m²      Physical Exam   Constitutional: She is oriented to person, place, and time. She appears well-developed and well-nourished.   She is overweight.   HENT:   Head: Normocephalic and atraumatic.   She has Mallampati class III anatomy.   Eyes: Pupils are equal, round, and reactive to light. EOM are normal.   Neck: Normal range of motion. Neck supple.   Cardiovascular: Normal rate, regular rhythm and normal heart sounds.   Pulmonary/Chest: Effort normal and breath sounds normal.   Abdominal: Soft. Bowel sounds are normal. "   Musculoskeletal: Normal range of motion. She exhibits no edema.   Neurological: She is alert and oriented to person, place, and time.   Skin: Skin is warm and dry.   Psychiatric: She has a normal mood and affect. Her behavior is normal.         Assessment/Plan   Jessica was seen today for sleeping problem.    Diagnoses and all orders for this visit:    Snoring  -     Home Sleep Study; Future    Obstructive sleep apnea, adult  -     Home Sleep Study; Future    Nocturnal hypoxemia  -     Home Sleep Study; Future    Overweight    Patient is a history of snoring and was observed to have low oxygen saturations while sleeping.  I think she gives an excellent story for obstructive sleep apnea.  We will plan to proceed to home sleep testing.  I discussed potential therapies including CPAP, weight control, oral appliance, and surgery.  We have also discussed the long-term consequences of untreated obstructive sleep apnea.  She is encouraged to lose weight.  She is encouraged avoid alcohol and sedatives close to bedtime.  She is encouraged to practice lateral position sleep.  We will see her back after her study.         Jann Naik MD [facility]  Sleep Medicine  Pulmonary and Critical Care Medicine

## 2019-12-31 ENCOUNTER — HOSPITAL ENCOUNTER (OUTPATIENT)
Dept: SLEEP MEDICINE | Facility: HOSPITAL | Age: 74
Discharge: HOME | End: 2019-12-31
Admitting: INTERNAL MEDICINE

## 2019-12-31 VITALS
RESPIRATION RATE: 16 BRPM | OXYGEN SATURATION: 95 % | SYSTOLIC BLOOD PRESSURE: 139 MMHG | HEART RATE: 118 BPM | WEIGHT: 153.8 LBS | DIASTOLIC BLOOD PRESSURE: 79 MMHG | HEIGHT: 62 IN | BODY MASS INDEX: 28.3 KG/M2

## 2019-12-31 DIAGNOSIS — R06.83 SNORING: ICD-10-CM

## 2019-12-31 DIAGNOSIS — G47.33 OBSTRUCTIVE SLEEP APNEA, ADULT: ICD-10-CM

## 2019-12-31 DIAGNOSIS — G47.34 NOCTURNAL HYPOXEMIA: ICD-10-CM

## 2019-12-31 PROCEDURE — 95806 SLEEP STUDY UNATT&RESP EFFT: CPT

## 2019-12-31 PROCEDURE — 95806 SLEEP STUDY UNATT&RESP EFFT: CPT | Performed by: INTERNAL MEDICINE

## 2020-01-02 DIAGNOSIS — G47.34 NOCTURNAL HYPOXEMIA: ICD-10-CM

## 2020-01-02 DIAGNOSIS — G47.33 OBSTRUCTIVE SLEEP APNEA, ADULT: Primary | ICD-10-CM

## 2020-01-02 DIAGNOSIS — E66.3 OVERWEIGHT: ICD-10-CM

## 2020-01-02 DIAGNOSIS — R06.83 SNORING: ICD-10-CM

## 2020-01-06 ENCOUNTER — LAB REQUISITION (OUTPATIENT)
Dept: LAB | Facility: HOSPITAL | Age: 75
End: 2020-01-06

## 2020-01-06 ENCOUNTER — OFFICE VISIT (OUTPATIENT)
Dept: OBSTETRICS AND GYNECOLOGY | Facility: CLINIC | Age: 75
End: 2020-01-06

## 2020-01-06 VITALS
HEIGHT: 62 IN | DIASTOLIC BLOOD PRESSURE: 80 MMHG | SYSTOLIC BLOOD PRESSURE: 140 MMHG | WEIGHT: 152.2 LBS | BODY MASS INDEX: 28.01 KG/M2

## 2020-01-06 DIAGNOSIS — N95.2 VAGINAL ATROPHY: ICD-10-CM

## 2020-01-06 DIAGNOSIS — N99.3 VAGINAL VAULT PROLAPSE AFTER HYSTERECTOMY: Primary | ICD-10-CM

## 2020-01-06 DIAGNOSIS — D62 ANEMIA DUE TO ACUTE BLOOD LOSS: ICD-10-CM

## 2020-01-06 DIAGNOSIS — Z00.00 ROUTINE GENERAL MEDICAL EXAMINATION AT A HEALTH CARE FACILITY: ICD-10-CM

## 2020-01-06 PROBLEM — N81.10 VAGINAL PROLAPSE: Status: RESOLVED | Noted: 2019-11-05 | Resolved: 2020-01-06

## 2020-01-06 PROCEDURE — 36415 COLL VENOUS BLD VENIPUNCTURE: CPT | Performed by: OBSTETRICS & GYNECOLOGY

## 2020-01-06 PROCEDURE — 99204 OFFICE O/P NEW MOD 45 MIN: CPT | Performed by: OBSTETRICS & GYNECOLOGY

## 2020-01-06 PROCEDURE — 57160 INSERT PESSARY/OTHER DEVICE: CPT | Performed by: OBSTETRICS & GYNECOLOGY

## 2020-01-06 PROCEDURE — A4562 PESSARY, NON RUBBER,ANY TYPE: HCPCS | Performed by: OBSTETRICS & GYNECOLOGY

## 2020-01-06 NOTE — PROGRESS NOTES
Chief Complaint   Patient presents with   • Bladder Prolapse     s/p rectal prolapse repair by Dr. Moore.         Jessica Duhram is a 74 y.o. year old  presenting to be seen in consultation from Dr. Don Moore, for evaluation of a possible cystocele versus vaginal vault prolapse.  This patient has a history of a vaginal hysterectomy/bilateral salpingo-oophorectomy done at the [facility] in .  She developed rectal prolapse.  In 2019 Dr. Moore did a laparotomy with procto-sigmoid colectomy; an appendectomy; creation of a permanent colostomy; and an umbilical herniorrhaphy.  An attempt to elevate her bladder was unsuccessful.  she presents today with bulging from the vagina.  She is continent of urine.  She has a permanent colostomy in place.  Her colostomy is functioning normally.  She denies urinary symptoms except for some nocturia.  She is treated for hypertension, gout, dyslipidemia, and GERD.  She does not use hormonal replacement therapy.  She is no longer sexually active.    Social History     Substance and Sexual Activity   Sexual Activity Defer     SCREENING TESTS  Mammograms are done in [place] and those records are not available.  Year 2012 2015 2016 2017 2018 2019 2020   Age                         PAP                         HPV high risk                         Mammogram                         JOSE C score                         Breast MRI                         Lipids                         Vitamin D                         Colonoscopy                         DEXA  Frax (hip/any)                         Ovarian Screen                             No Additional Complaints Reported    The following portions of the patient's history were reviewed and updated as appropriate:vital signs and   She  has a past medical history of Arthritis, Gout, Hyperlipidemia, Hypertension, Osteoporosis,  Rectal prolapse, and Rectocele.  She does not have any pertinent problems on file.  She  has a past surgical history that includes Bladder repair; Eye surgery; Colonoscopy; Colectomy (N/A, 11/4/2019); Appendectomy; and vaginal hysterectomy salpingo oophorectomy (Bilateral).  Her family history includes Cancer in her sister; Colon cancer in her maternal grandmother; Diabetes in her father, paternal aunt, and paternal uncle; Heart disease in her mother; Hypertension in her mother; Osteoporosis in her mother; Other in her mother; Stroke in her father; Thyroid disease in her sister.  She  reports that she has quit smoking. She has never used smokeless tobacco. She reports that she does not drink alcohol or use drugs.  Current Outpatient Medications   Medication Sig Dispense Refill   • allopurinol (ZYLOPRIM) 300 MG tablet Take 150 mg by mouth Daily.     • aspirin 81 MG tablet Take 81 mg by mouth 2 (Two) Times a Day.     • Calcium Carbonate-Vitamin D (CALTRATE 600+D PO) Take  by mouth.     • docusate sodium (COLACE) 100 MG capsule Take 100 mg by mouth 2 (Two) Times a Day.     • esomeprazole (nexIUM) 20 MG capsule Take 20 mg by mouth Every Morning Before Breakfast.     • ferrous sulfate 324 (65 Fe) MG tablet delayed-release EC tablet Take 324 mg by mouth Daily With Breakfast.     • losartan (COZAAR) 50 MG tablet      • Multiple Vitamins-Minerals (CENTRUM SILVER ADULT 50+ PO) Take 1 tablet by mouth Daily.     • Polyethylene Glycol 3350 (MIRALAX PO) Take  by mouth.     • simvastatin (ZOCOR) 20 MG tablet Take 20 mg by mouth Every Night.       No current facility-administered medications for this visit.      Facility-Administered Medications Ordered in Other Visits   Medication Dose Route Frequency Provider Last Rate Last Dose   • acetaminophen (TYLENOL) tablet 1,000 mg  1,000 mg Oral Once Don Moore MD       • gabapentin (NEURONTIN) capsule 600 mg  600 mg Oral Once Don Moore MD       • meloxicam (MOBIC) tablet  "15 mg  15 mg Oral Once Don Moore MD         She has No Known Allergies..        Review of Systems  A comprehensive review of systems was done.  Constitutional: negative for fever, chills, activity change, appetite change, fatigue and unexpected weight change.  Respiratory: negative  Cardiovascular: negative  Gastrointestinal: positive for colostomy  Genitourinary:positive for vaginal pressure and bulging  Musculoskeletal:negative  Behavioral/Psych: negative          /80   Ht 157.5 cm (62\")   Wt 69 kg (152 lb 3.2 oz)   Breastfeeding No   BMI 27.84 kg/m²     Physical Exam    General:  alert; cooperative; well developed; well nourished   Skin:  No suspicious lesions seen   Thyroid: normal to inspection and palpation   Lungs:  clear to auscultation bilaterally   Heart:  regular rate and rhythm, S1, S2 normal, no murmur, click, rub or gallop   Breasts:  Not performed.  per request   Abdomen: soft, non-tender; no masses  no umbilical or inguinal hernias are present  no hepato-splenomegaly  midline scar.  Colostomy functioning.   Pelvis: Clinical staff was present for exam  External genitalia:  normal appearance of the external genitalia including Bartholin's and Law[place]'s glands.  Vaginal:  atrophic mucosal changes are present;  Cervix:  absent.  Uterus:  absent.  Adnexa:  absent, bilateral.  Vaginal vault prolapse GRADE 3     Lab Review   CBC results and BMP results    Imaging   No data reviewed    Medical counseling was given to patient for the following topics: diagnostic results, instructions for management, risk factor reductions, prognosis, impressions, risks and benefits of treatment options and importance of treatment compliance . Total time of the encounter was 45 minute(s) and 27 minute(s)  was spent in face to face counseling.  I have spent an extensive period of time explaining to the patient her defect of a vaginal vault prolapse as opposed to a cystocele or rectocele.  I have explained the " anatomical changes to the patient.  I have discussed with her options of therapy including the surgical approach with a sacro-colpopexy versus pessary.  She is not anxious to have surgery at the present time if possible.  I have fitted her with a #5 ring pessary with support.  She was able to tolerate this well while moving in the room.  She was not able to remove the pessary herself and I replaced it with a permanent #5 ring pessary with support.  I have explained vaginal atrophy to her, and the need to use Premarin vaginal cream in a dose of 1 g weekly to treat that problem, and improve the vaginal mucosa.  She desires to come here on a regular basis for pessary removal, cleaning, and replacement.  I have given her instructions to contact me should she have difficulty voiding.          ASSESSMENT  Problems Addressed this Visit        Genitourinary    Vaginal vault prolapse after hysterectomy - Primary           Substance History:    reports that she has quit smoking. She has never used smokeless tobacco.    reports that she does not drink alcohol.    reports that she does not use drugs.    Substance use counseling is not indicated based on patient history.      PLAN    · Medications prescribed this encounter:  No orders of the defined types were placed in this encounter.  · CBC and CMP ordered  · Premarin vaginal cream, 1 g intravaginally on a weekly basis  · #5 ring pessary with support fitted  · Follow up: 4 week(s)  · Calcium, 600 mg/ Vit. D, 400 IU daily     *Please note that portions of this documentation may have been completed with a voice recognition program.  Efforts were made to edit this dictation, but occasional words may have been mistranscribed.     This note was electronically signed.    VINICIUS Nicholson MD  January 6, 2020  1:40 PM

## 2020-01-07 ENCOUNTER — TELEPHONE (OUTPATIENT)
Dept: OBSTETRICS AND GYNECOLOGY | Facility: CLINIC | Age: 75
End: 2020-01-07

## 2020-01-07 LAB
ALBUMIN SERPL-MCNC: 4.3 G/DL (ref 3.5–5.2)
ALBUMIN/GLOB SERPL: 1.7 G/DL
ALP SERPL-CCNC: 116 U/L (ref 39–117)
ALT SERPL-CCNC: 20 U/L (ref 1–33)
AST SERPL-CCNC: 26 U/L (ref 1–32)
BILIRUB SERPL-MCNC: 0.3 MG/DL (ref 0.2–1.2)
BUN SERPL-MCNC: 16 MG/DL (ref 8–23)
BUN/CREAT SERPL: 11.6 (ref 7–25)
CALCIUM SERPL-MCNC: 9.7 MG/DL (ref 8.6–10.5)
CHLORIDE SERPL-SCNC: 103 MMOL/L (ref 98–107)
CO2 SERPL-SCNC: 26.1 MMOL/L (ref 22–29)
CREAT SERPL-MCNC: 1.38 MG/DL (ref 0.57–1)
ERYTHROCYTE [DISTWIDTH] IN BLOOD BY AUTOMATED COUNT: 13.3 % (ref 12.3–15.4)
GLOBULIN SER CALC-MCNC: 2.6 GM/DL
GLUCOSE SERPL-MCNC: 97 MG/DL (ref 65–99)
HCT VFR BLD AUTO: 32.7 % (ref 34–46.6)
HGB BLD-MCNC: 11 G/DL (ref 12–15.9)
MCH RBC QN AUTO: 31.4 PG (ref 26.6–33)
MCHC RBC AUTO-ENTMCNC: 33.6 G/DL (ref 31.5–35.7)
MCV RBC AUTO: 93.4 FL (ref 79–97)
PLATELET # BLD AUTO: 259 10*3/MM3 (ref 140–450)
POTASSIUM SERPL-SCNC: 4.5 MMOL/L (ref 3.5–5.2)
PROT SERPL-MCNC: 6.9 G/DL (ref 6–8.5)
RBC # BLD AUTO: 3.5 10*6/MM3 (ref 3.77–5.28)
SODIUM SERPL-SCNC: 143 MMOL/L (ref 136–145)
WBC # BLD AUTO: 6.11 10*3/MM3 (ref 3.4–10.8)

## 2020-01-07 NOTE — TELEPHONE ENCOUNTER
Attempted to contact patient at her request.  No answer, but I left a message on voicemail letting Jessica know that I would try to reach her at another time.

## 2020-01-08 ENCOUNTER — TELEPHONE (OUTPATIENT)
Dept: OBSTETRICS AND GYNECOLOGY | Facility: CLINIC | Age: 75
End: 2020-01-08

## 2020-01-08 NOTE — TELEPHONE ENCOUNTER
PT IS CALLING AND MADE A DECISION ON HER OWN TO USE PREMARIN - TELL CHELSI THANKS BUT SHE IS GOOD AND DOESN'T NEEDS TO CALL HER BACK

## 2020-01-08 NOTE — TELEPHONE ENCOUNTER
Patient calls back and states that she has figured out what she needs to do, and does not require a call back from me.

## 2020-01-14 ENCOUNTER — OFFICE VISIT (OUTPATIENT)
Dept: SLEEP MEDICINE | Facility: HOSPITAL | Age: 75
End: 2020-01-14

## 2020-01-14 VITALS
HEIGHT: 62 IN | OXYGEN SATURATION: 97 % | DIASTOLIC BLOOD PRESSURE: 72 MMHG | HEART RATE: 89 BPM | SYSTOLIC BLOOD PRESSURE: 128 MMHG | BODY MASS INDEX: 27.9 KG/M2 | WEIGHT: 151.6 LBS

## 2020-01-14 DIAGNOSIS — G47.33 OSA (OBSTRUCTIVE SLEEP APNEA): ICD-10-CM

## 2020-01-14 DIAGNOSIS — E66.3 OVERWEIGHT: ICD-10-CM

## 2020-01-14 DIAGNOSIS — R06.83 SNORING: Primary | ICD-10-CM

## 2020-01-14 PROCEDURE — 99214 OFFICE O/P EST MOD 30 MIN: CPT | Performed by: INTERNAL MEDICINE

## 2020-01-14 NOTE — PROGRESS NOTES
Subjective   Jessica Durham is a 75 y.o. female is here today for follow-up.  She is seen for follow-up of snoring and nonrestorative sleep.  Her primary care physician is Isabella Munroe MD.  She is also seen by KRISTIAN Mays.    History of Present Illness  Patient is seen in this clinic December 23.  She had snoring and nonrestorative sleep she also has a history of nocturnal hypoxemia.  She has a history of hypertension and is overweight.  She says she has been sleeping about the same.  She says she falls asleep okay but is up frequently at night she thinks to go to the bathroom.  She denies any real changes in health status.  She is being considered for bladder repair.  She says she usually sleeps on her side.  She had her home sleep test on December 31.  She thought it was a fairly usual night.  Past Medical History:   Diagnosis Date   • Arthritis    • Gout    • Hyperlipidemia    • Hypertension    • Osteoporosis    • Rectal prolapse    • Rectocele        Past Surgical History:   Procedure Laterality Date   • APPENDECTOMY     • BLADDER REPAIR     • COLON RESECTION N/A 11/4/2019    Procedure: PROCTOSIGMOID COLECTOMY, COLOSTOMY, APPENDECTOMY, REMOVAL OF PESSARY, VAGINOPEXY WITH INTRENSIC BRIDGE, UMBILICAL HERNIA REPAIR, AND OMENTUM FLAP;  Surgeon: Don Moore MD;  Location: [facility];  Service: General   • COLONOSCOPY     • EYE SURGERY      bilateral lens   • VAGINAL HYSTERECTOMY SALPINGO OOPHORECTOMY Bilateral            Current Outpatient Medications:   •  allopurinol (ZYLOPRIM) 300 MG tablet, Take 150 mg by mouth Daily., Disp: , Rfl:   •  aspirin 81 MG tablet, Take 81 mg by mouth 2 (Two) Times a Day., Disp: , Rfl:   •  Calcium Carbonate-Vitamin D (CALTRATE 600+D PO), Take  by mouth., Disp: , Rfl:   •  conjugated estrogens (PREMARIN) 0.625 MG/GM vaginal cream, Insert 1.0 grams intravaginally, once a week, Disp: 30 g, Rfl: 2  •  conjugated estrogens (PREMARIN) 0.625 MG/GM vaginal cream, 1 gram by  "vaginal route once each week, Disp: 2 each, Rfl: 0  •  docusate sodium (COLACE) 100 MG capsule, Take 100 mg by mouth 2 (Two) Times a Day., Disp: , Rfl:   •  esomeprazole (nexIUM) 20 MG capsule, Take 20 mg by mouth Every Morning Before Breakfast., Disp: , Rfl:   •  ferrous sulfate 324 (65 Fe) MG tablet delayed-release EC tablet, Take 324 mg by mouth Daily With Breakfast., Disp: , Rfl:   •  losartan (COZAAR) 50 MG tablet, , Disp: , Rfl:   •  Multiple Vitamins-Minerals (CENTRUM SILVER ADULT 50+ PO), Take 1 tablet by mouth Daily., Disp: , Rfl:   •  Polyethylene Glycol 3350 (MIRALAX PO), Take  by mouth., Disp: , Rfl:   •  simvastatin (ZOCOR) 20 MG tablet, Take 20 mg by mouth Every Night., Disp: , Rfl:   No current facility-administered medications for this visit.     Facility-Administered Medications Ordered in Other Visits:   •  acetaminophen (TYLENOL) tablet 1,000 mg, 1,000 mg, Oral, Once, Don Moore MD  •  gabapentin (NEURONTIN) capsule 600 mg, 600 mg, Oral, Once, Don Moore MD  •  meloxicam (MOBIC) tablet 15 mg, 15 mg, Oral, Once, Don Moore MD    No Known Allergies    The following portions of the patient's history were reviewed and updated as appropriate: allergies, current medications and problem list.    Review of Systems   Constitutional: Negative.    HENT: Positive for hearing loss, postnasal drip and tinnitus.    Eyes: Positive for visual disturbance.   Respiratory: Positive for cough and wheezing.    Cardiovascular: Negative.    Gastrointestinal: Negative.    Endocrine: Negative.    Genitourinary: Positive for frequency.   Musculoskeletal: Positive for back pain and joint swelling.   Skin: Negative.    Allergic/Immunologic: Negative.    Neurological: Negative.    Hematological: Negative.    Psychiatric/Behavioral: Negative.        Objective     /72   Pulse 89   Ht 157.5 cm (62\")   Wt 68.8 kg (151 lb 9.6 oz)   SpO2 97%   BMI 27.73 kg/m²     Physical Exam   Constitutional: She is " oriented to person, place, and time. She appears well-developed and well-nourished.   She is overweight.   HENT:   Head: Normocephalic and atraumatic.   She has Mallampati class IV anatomy.   Eyes: Pupils are equal, round, and reactive to light. EOM are normal.   Neck: Normal range of motion. Neck supple.   Cardiovascular: Normal rate, regular rhythm and normal heart sounds.   Pulmonary/Chest: Effort normal and breath sounds normal.   Abdominal: Soft. Bowel sounds are normal.   Musculoskeletal: Normal range of motion. She exhibits no edema.   Neurological: She is alert and oriented to person, place, and time.   Skin: Skin is warm and dry.   Psychiatric: She has a normal mood and affect. Her behavior is normal.   Home sleep testing on December 31 showed an AHI of 21.3.  This is consistent with moderate obstructive sleep apnea.  She had an index when supine of 31.  Her index on her left side was 5.9 and on her right side 7.1.  This shows significant positional effect.  She spent 5 minutes with a oxygenation below 90%.      Assessment/Plan   Jessica was seen today for follow-up.    Diagnoses and all orders for this visit:    Snoring    ABHISHEK (obstructive sleep apnea)    Overweight    Patient does have moderate obstructive sleep apnea.  We have discussed possible therapies including CPAP, weight control, oral appliance, and surgery.  She says she wishes to try to lose some weight but realizes it will take some time.  She is willing to try CPAP.  We will order CPAP then with an AutoSet device.  We will plan to see her back in 2 months.  We can download her device at that time and make certain it is controlling her respiratory events.  She is encouraged to lose weight.  She is encouraged avoid alcohol and sedatives close to bedtime.  She is encouraged to practice lateral position sleep.             Jann Naik MD [facility]  Sleep Medicine  Pulmonary and Critical Care Medicine      01/14/20  4:52 PM

## 2020-01-14 NOTE — PATIENT INSTRUCTIONS

## 2020-01-20 ENCOUNTER — TELEPHONE (OUTPATIENT)
Dept: OBSTETRICS AND GYNECOLOGY | Facility: CLINIC | Age: 75
End: 2020-01-20

## 2020-01-20 NOTE — TELEPHONE ENCOUNTER
PT IS CALLING STATES HER PESSARY CAME OUT THIS MORNING AND WOULD LIKE TO SPEAK TO THE NURSE   PLEASE CALL HER [phone]

## 2020-01-23 ENCOUNTER — OFFICE VISIT (OUTPATIENT)
Dept: OBSTETRICS AND GYNECOLOGY | Facility: CLINIC | Age: 75
End: 2020-01-23

## 2020-01-23 VITALS
BODY MASS INDEX: 27.97 KG/M2 | DIASTOLIC BLOOD PRESSURE: 80 MMHG | WEIGHT: 152 LBS | SYSTOLIC BLOOD PRESSURE: 140 MMHG | HEIGHT: 62 IN

## 2020-01-23 DIAGNOSIS — N99.3 VAGINAL VAULT PROLAPSE AFTER HYSTERECTOMY: Primary | ICD-10-CM

## 2020-01-23 PROCEDURE — 99213 OFFICE O/P EST LOW 20 MIN: CPT | Performed by: OBSTETRICS & GYNECOLOGY

## 2020-01-23 RX ORDER — ALENDRONATE SODIUM 70 MG/1
70 TABLET ORAL
COMMUNITY
End: 2020-08-25

## 2020-01-23 NOTE — PROGRESS NOTES
"Date of procedure: 01/23/2020  This patient was seen office in consultation from Dr. Don Moore for evaluation of vaginal vault prolapse following a hysterectomy.  She has had a vaginal hysterectomy/bilateral salpingo-oophorectomy done elsewhere.  Dr. Moore operated on the patient for rectal prolapse, on 11/4/2019.  He did a proctoscopy sigmoid resection; and appendectomy; a vaginal pexy; and an umbilical herniorrhaphy.  He created a permanent colostomy.  Thereafter, the patient developed bulging from the vagina.  She was seen in my office and was fitted with a #5 ring pessary with support.  She tolerated this well, however 5 days ago the pessary spontaneously expelled.  She is here today for refitting.  She has no urinary symptoms.    She has chronic renal disease and hypertension.  Risks and benefits discussed? yes  All questions answered? yes      A comprehensive review of systems was done.  Constitutional: negative for fever, chills, activity change, appetite change, fatigue and unexpected weight change.  Respiratory: negative  Cardiovascular: negative  Gastrointestinal: colostomy  Genitourinary:negative  Musculoskeletal:positive for back pain  Behavioral/Psych: negative    /80   Ht 157.5 cm (62\")   Wt 68.9 kg (152 lb)   Breastfeeding No   BMI 27.80 kg/m²   Lungs- Clear to auscultation  Heart- RRR with no murmur, rub or gallop  Abdomen- Colostomy functioning, no organomegaly     Clinical staff was present for exam  External genitalia:  normal appearance of the external genitalia including Bartholin's and [place]'s glands.  Vaginal:  normal pink mucosa without prolapse or lesions.  Cervix:  absent.  Uterus:  absent.  Adnexa:  absent, bilateral.  Vaginal vault prolapse GRADE 3     The patient was fitted with a #6 ring pessary with support which remained in place with movement and with straining.    Foldable ring w/ support - #6 w/o urethral bar    ASSESSMENT  Problems Addressed this Visit        " Genitourinary    Vaginal vault prolapse after hysterectomy - Primary          PLAN   1. Medications prescribed this encounter:  No orders of the defined types were placed in this encounter.  2. The patient was fitted with a new pessary, #6 ring with support  3. Follow up: 4 week(s)  *Please note that portions of this documentation may have been completed with a voice recognition program.  Efforts were made to edit this dictation, but occasional words may have been mistranscribed.    Post procedure instructions: Call ASAP if increasing pain or trouble passing urine or   bowels    Counseling was given to patient for the following topics: diagnostic results, instructions for management, risk factor reductions, impressions and risks and benefits of treatment options . Total time of the encounter was 22 minute(s) and 12 minute(s)  was spent counseling.  I have advised the patient that the #5 ring pessary is not adequate.  I have counseled her that I will increase the size of the pessary to a #6 ring with support.  I have once again reviewed with her that she needs to notify me if she has difficulty voiding.  She has a permanent colostomy in place.  I have counseled the patient about the risks of vaginal infection and irritation with the pessary.  She is comfortable with this in place.      *Please note that portions of this documentation may have been completed with a voice recognition program.  Efforts were made to edit this dictation, but occasional words may have been mistranscribed.  This note was electronically signed.    VINICIUS Nicholson MD  January 23, 2020  2:39 PM

## 2020-02-27 ENCOUNTER — OFFICE VISIT (OUTPATIENT)
Dept: OBSTETRICS AND GYNECOLOGY | Facility: CLINIC | Age: 75
End: 2020-02-27

## 2020-02-27 VITALS
BODY MASS INDEX: 28.41 KG/M2 | SYSTOLIC BLOOD PRESSURE: 130 MMHG | DIASTOLIC BLOOD PRESSURE: 82 MMHG | WEIGHT: 154.4 LBS | HEIGHT: 62 IN

## 2020-02-27 DIAGNOSIS — N99.3 VAGINAL VAULT PROLAPSE AFTER HYSTERECTOMY: Primary | ICD-10-CM

## 2020-02-27 PROCEDURE — 99213 OFFICE O/P EST LOW 20 MIN: CPT | Performed by: OBSTETRICS & GYNECOLOGY

## 2020-02-27 RX ORDER — PNV NO.95/FERROUS FUM/FOLIC AC 28MG-0.8MG
1 TABLET ORAL DAILY
COMMUNITY
End: 2022-11-01

## 2020-02-27 RX ORDER — APIXABAN 5 MG/1
1 TABLET, FILM COATED ORAL 2 TIMES DAILY
COMMUNITY
Start: 2020-02-19 | End: 2021-01-05

## 2020-02-27 NOTE — PROGRESS NOTES
"Date of procedure: This patient has previously had a vaginal hysterectomy/bilateral salpingo-oophorectomy.  She presented for my care with vaginal vault prolapse following a hysterectomy.  She did not desire surgery and was fitted with a #6 ring pessary with support.  She has had good results.  She still has some nocturia, but does not desire medical treatment.  Dr. Moore did a laparotomy with a partial colon resection, creation of colostomy, a vaginal pexy, and an umbilical herniorrhaphy.  Her colostomy is functioning well.    She recently had small pulmonary emboli.  She has been using Premarin vaginal cream and a dose of 1 g weekly.  Risks and benefits discussed? yes  All questions answered? yes      A comprehensive review of systems was done.  Constitutional: negative for fever, chills, activity change, appetite change, fatigue and unexpected weight change.  Respiratory: negative  Cardiovascular: negative  Gastrointestinal: negative  Genitourinary:negative  Musculoskeletal:negative  Behavioral/Psych: negative    /82   Ht 157.5 cm (62\")   Wt 70 kg (154 lb 6.4 oz)   Breastfeeding No   BMI 28.24 kg/m²   Lungs- Clear to auscultation  Heart- RRR with no murmur, rub or gallop  Abdomen- Soft, non-tender with no organomegaly     External genitalia:  normal appearance of the external genitalia including Bartholin's and [place]'s glands.  Vaginal:  normal pink mucosa without prolapse or lesions.  Cervix:  absent.  Uterus:  absent.  Adnexa:  absent, bilateral.  Rectal:  anus visually normal appearing. recto-vaginal exam unremarkable and confirms findings;  Vaginal vault prolapse GRADE 3     The pessary was removed, cleaned and replaced.    Foldable ring w/ support - #6 w/o urethral bar    ASSESSMENT  Problems Addressed this Visit        Genitourinary    Vaginal vault prolapse after hysterectomy - Primary          PLAN   1. Medications prescribed this encounter:  No orders of the defined types were placed in this " encounter.  2. I have instructed the patient to discontinue use of Premarin vaginal cream since she had 2 small pulmonary emboli.  She will use Replens vaginal moisturizer instead.  3. Follow up: 3 month(s)  *Please note that portions of this documentation may have been completed with a voice recognition program.  Efforts were made to edit this dictation, but occasional words may have been mistranscribed.    Post procedure instructions: Call ASAP if increasing pain or trouble passing urine or   bowels    Counseling was given to patient for the following topics: diagnostic results, instructions for management, risk factor reductions, prognosis, impressions and risks and benefits of treatment options . Total time of the encounter was 19 minute(s) and 11 minute(s)  was spent counseling.  I have advised the patient to discontinue the use of Premarin cream and to use Replens.  I have counseled her to attempt to empty her bladder more frequently and to initiate Kegel's exercises 5 times a day.  I have instructed her to avoid heavy lifting and straining.  I have reviewed the potential complications of pessary use.      *Please note that portions of this documentation may have been completed with a voice recognition program.  Efforts were made to edit this dictation, but occasional words may have been mistranscribed.  This note was electronically signed.    VINICIUS Nicholson MD  February 27, 2020  2:26 PM

## 2020-05-15 ENCOUNTER — TELEMEDICINE (OUTPATIENT)
Dept: SLEEP MEDICINE | Facility: HOSPITAL | Age: 75
End: 2020-05-15

## 2020-05-15 DIAGNOSIS — E66.3 OVERWEIGHT: ICD-10-CM

## 2020-05-15 DIAGNOSIS — G47.33 OSA (OBSTRUCTIVE SLEEP APNEA): Primary | ICD-10-CM

## 2020-05-15 PROCEDURE — 99213 OFFICE O/P EST LOW 20 MIN: CPT | Performed by: INTERNAL MEDICINE

## 2020-05-16 NOTE — PROGRESS NOTES
Subjective   Jessica Durham is a 75 y.o. female is here today for follow-up.  She is seen for follow-up of obstructive sleep apnea.  Her primary care physician is Dr. Munroe.  She is also seen by Ms. Johnson.  You have chosen to receive care through a telehealth visit.  Do you consent to use a video/audio connection for your medical care today? Yes    History of Present Illness  Patient was last seen in clinic January 14, 2020.  She has obstructive sleep apnea with a index of 21.3 on her previously.  She also has a history of hypertension and is overweight.  She says she is doing very well with her machine she feels more rested when she uses it.  She says she is up frequently at night to go the bathroom.  She denies any snoring when she has mask on.  She denies any real problems with machine or mask.  She was found in February to have pulmonary emboli and is now on anticoagulation.  Past Medical History:   Diagnosis Date   • Arthritis    • Gout    • Hyperlipidemia    • Hypertension    • Osteoporosis    • Pulmonary embolism (CMS/HCC)    • Rectal prolapse    • Rectocele        Past Surgical History:   Procedure Laterality Date   • APPENDECTOMY     • BLADDER REPAIR     • COLON RESECTION N/A 11/4/2019    Procedure: PROCTOSIGMOID COLECTOMY, COLOSTOMY, APPENDECTOMY, REMOVAL OF PESSARY, VAGINOPEXY WITH INTRENSIC BRIDGE, UMBILICAL HERNIA REPAIR, AND OMENTUM FLAP;  Surgeon: Don Moore MD;  Location: [facility];  Service: General   • COLONOSCOPY     • EYE SURGERY      bilateral lens   • VAGINAL HYSTERECTOMY SALPINGO OOPHORECTOMY Bilateral            Current Outpatient Medications:   •  alendronate (FOSAMAX) 70 MG tablet, Take 70 mg by mouth Every 7 (Seven) Days., Disp: , Rfl:   •  allopurinol (ZYLOPRIM) 300 MG tablet, Take 150 mg by mouth Daily., Disp: , Rfl:   •  Calcium Carbonate-Vitamin D (CALTRATE 600+D PO), Take  by mouth., Disp: , Rfl:   •  conjugated estrogens (PREMARIN) 0.625 MG/GM vaginal cream, 1 gram by  vaginal route once each week, Disp: 2 each, Rfl: 0  •  docusate sodium (COLACE) 100 MG capsule, Take 100 mg by mouth 2 (Two) Times a Day., Disp: , Rfl:   •  ELIQUIS 5 MG tablet tablet, Take 1 tablet by mouth 2 (Two) Times a Day., Disp: , Rfl:   •  esomeprazole (nexIUM) 20 MG capsule, Take 20 mg by mouth Every Morning Before Breakfast., Disp: , Rfl:   •  ferrous sulfate 324 (65 Fe) MG tablet delayed-release EC tablet, Take 324 mg by mouth Daily With Breakfast., Disp: , Rfl:   •  ferrous sulfate 325 (65 Fe) MG tablet, Take 1 tablet by mouth Daily., Disp: , Rfl:   •  losartan (COZAAR) 50 MG tablet, , Disp: , Rfl:   •  Multiple Vitamins-Minerals (CENTRUM SILVER ADULT 50+ PO), Take 1 tablet by mouth Daily., Disp: , Rfl:   •  mupirocin (BACTROBAN) 2 % ointment, , Disp: , Rfl:   •  Polyethylene Glycol 3350 (MIRALAX PO), Take  by mouth., Disp: , Rfl:   •  simvastatin (ZOCOR) 20 MG tablet, Take 20 mg by mouth Every Night., Disp: , Rfl:   No current facility-administered medications for this visit.     Facility-Administered Medications Ordered in Other Visits:   •  acetaminophen (TYLENOL) tablet 1,000 mg, 1,000 mg, Oral, Once, Don Moore MD  •  gabapentin (NEURONTIN) capsule 600 mg, 600 mg, Oral, Once, Don Moore MD  •  meloxicam (MOBIC) tablet 15 mg, 15 mg, Oral, Once, Don Moore MD    No Known Allergies    The following portions of the patient's history were reviewed and updated as appropriate: allergies, current medications and problem list.    Review of Systems   Constitutional: Negative.    HENT: Positive for hearing loss, postnasal drip and tinnitus.    Eyes: Positive for visual disturbance.   Respiratory: Positive for cough and wheezing.    Cardiovascular: Negative.    Gastrointestinal: Negative.    Endocrine: Negative.    Genitourinary: Positive for frequency.   Musculoskeletal: Positive for back pain and joint swelling.   Skin: Negative.    Allergic/Immunologic: Negative.    Neurological:  Negative.    Hematological: Negative.    Psychiatric/Behavioral: Negative.    [place] score is 5/24    Objective     There were no vitals taken for this visit.    Physical Exam  Patient is awake and alert.  She does not appear to be any respiratory distress.  Her stated weight is 165 pounds.  Her height 5 feet 2 inches.  This gives her a body mass index of 29.    Download from CPAP machine for the past 90 days shows she is used it 94.4% of the time.  She using it 7 hours 36 minutes per night.  Her AHI 4.7.  Her 90th percentile pressure is 10.3.    Assessment/Plan   Diagnoses and all orders for this visit:    ABHISHEK (obstructive sleep apnea)  -     Detailed AutoPAP Order    Overweight    Patient does have moderate obstructive sleep apnea but has excellent control of her respirations on her current pressures.  We will continue these pressures.  We will renew her supplies.  We will encourage her to lose weight.  Will encourage her to avoid alcohol and sedatives close to bedtime.  We will encourage her to practice lateral position sleep.  We will plan to see her back in 1 year.  She is to contact us earlier if symptoms worsen.    Total time: 15 minutes exclusive of procedures.             Jann Naik MD [facility]  Sleep Medicine  Pulmonary and Critical Care Medicine      05/16/20  11:28 AM

## 2020-05-28 ENCOUNTER — OFFICE VISIT (OUTPATIENT)
Dept: OBSTETRICS AND GYNECOLOGY | Facility: CLINIC | Age: 75
End: 2020-05-28

## 2020-05-28 VITALS
SYSTOLIC BLOOD PRESSURE: 142 MMHG | BODY MASS INDEX: 29.92 KG/M2 | HEIGHT: 62 IN | DIASTOLIC BLOOD PRESSURE: 78 MMHG | WEIGHT: 162.6 LBS

## 2020-05-28 DIAGNOSIS — N99.3 VAGINAL VAULT PROLAPSE AFTER HYSTERECTOMY: Primary | ICD-10-CM

## 2020-05-28 DIAGNOSIS — Z90.49 S/P PARTIAL COLECTOMY: ICD-10-CM

## 2020-05-28 PROCEDURE — 99213 OFFICE O/P EST LOW 20 MIN: CPT | Performed by: OBSTETRICS & GYNECOLOGY

## 2020-08-25 ENCOUNTER — OFFICE VISIT (OUTPATIENT)
Dept: OBSTETRICS AND GYNECOLOGY | Facility: CLINIC | Age: 75
End: 2020-08-25

## 2020-08-25 VITALS
DIASTOLIC BLOOD PRESSURE: 92 MMHG | BODY MASS INDEX: 31.36 KG/M2 | WEIGHT: 170.4 LBS | SYSTOLIC BLOOD PRESSURE: 150 MMHG | TEMPERATURE: 98.6 F | HEIGHT: 62 IN

## 2020-08-25 DIAGNOSIS — N99.3 VAGINAL VAULT PROLAPSE AFTER HYSTERECTOMY: Primary | ICD-10-CM

## 2020-08-25 PROCEDURE — 99213 OFFICE O/P EST LOW 20 MIN: CPT | Performed by: OBSTETRICS & GYNECOLOGY

## 2020-08-25 NOTE — PROGRESS NOTES
"Date of procedure:  8/25/2020  This patient has previously had a vaginal hysterectomy/bilateral salpingo-oophorectomy done elsewhere.  She presented for my care with vaginal vault prolapse.  She is treated with a #6 ring pessary with support with good results.  She has no bowel or urinary complaints.  She has no abnormal discharge.  She is using Replens vaginal moisturizer 3-4 times a week.    Risks and benefits discussed? yes  All questions answered? yes      A review of systems was done.  She denies cough, fever, shortness of breath, or loss of her sense of taste or smell.  Constitutional: negative for fever, chills, activity change, appetite change, fatigue and unexpected weight change.  Respiratory: negative  Cardiovascular: negative  Gastrointestinal: negative  Genitourinary:negative  Musculoskeletal:negative  Behavioral/Psych: negative    /92   Temp 98.6 °F (37 °C)   Ht 157.5 cm (62\")   Wt 77.3 kg (170 lb 6.4 oz)   Breastfeeding No   BMI 31.17 kg/m²   Lungs- Clear to auscultation  Heart- RRR with no murmur, rub or gallop  Abdomen- Soft, non-tender with no organomegaly     Clinical staff was present for exam  External genitalia:  normal appearance of the external genitalia including Bartholin's and [place]'s glands.  Vaginal:  normal pink mucosa without prolapse or lesions.  Cervix:  absent.  Uterus:  absent.  Adnexa:  absent, bilateral.  Rectal:  anus visually normal appearing. recto-vaginal exam unremarkable and confirms findings;  Vaginal vault prolapse GRADE 2     The pessary was removed, cleaned and replaced.    Foldable ring w/ support - #6 w/o urethral bar    ASSESSMENT  Problems Addressed this Visit        Genitourinary    Vaginal vault prolapse after hysterectomy - Primary          PLAN   1. Medications prescribed this encounter:  No orders of the defined types were placed in this encounter.  2. She will continue use of the #6 ring pessary with support  3. Follow up: 4 month(s)  *Please note " that portions of this documentation may have been completed with a voice recognition program.  Efforts were made to edit this dictation, but occasional words may have been mistranscribed.    Post procedure instructions: Call ASAP if increasing pain or trouble passing urine or   bowels    Counseling was given to patient for the following topics: diagnostic results, instructions for management, risk factor reductions, impressions and risks and benefits of treatment options . Total time of the encounter was 17 minute(s) and 9 minute(s)  was spent counseling.  I have advised the patient that the pessary is working effectively.  I have counseled her to continue using Replens vaginal moisturizer at least 3-4 times a week.  I have advised her that the vaginal dryness is improved.  I have counseled her to be sure that she is emptying her bladder adequately.  I have encouraged her to have a mammogram scheduled.      *Please note that portions of this documentation may have been completed with a voice recognition program.  Efforts were made to edit this dictation, but occasional words may have been mistranscribed.  This note was electronically signed.    VINICIUS Nicholson MD  August 25, 2020  15:25

## 2021-01-05 ENCOUNTER — OFFICE VISIT (OUTPATIENT)
Dept: OBSTETRICS AND GYNECOLOGY | Facility: CLINIC | Age: 76
End: 2021-01-05

## 2021-01-05 VITALS
HEIGHT: 62 IN | BODY MASS INDEX: 32.13 KG/M2 | DIASTOLIC BLOOD PRESSURE: 72 MMHG | WEIGHT: 174.6 LBS | SYSTOLIC BLOOD PRESSURE: 130 MMHG

## 2021-01-05 DIAGNOSIS — N99.3 VAGINAL VAULT PROLAPSE AFTER HYSTERECTOMY: Primary | ICD-10-CM

## 2021-01-05 PROCEDURE — 99213 OFFICE O/P EST LOW 20 MIN: CPT | Performed by: OBSTETRICS & GYNECOLOGY

## 2021-01-05 RX ORDER — ASPIRIN 81 MG/1
81 TABLET ORAL NIGHTLY
COMMUNITY

## 2021-01-05 NOTE — PROGRESS NOTES
"Date of procedure: 01/05/2021  This patient is being seen for a chronic problem of vaginal vault prolapse following a vaginal hysterectomy/bilateral salpingo-oophorectomy done elsewhere.  Prognosis is good with use of a pessary.  She is tolerating her pessary well with normal bowel and urinary function.    Risks and benefits discussed? yes  All questions answered? Yes  This patient has also had a proctoscopy-sigmoid colectomy with creation of a colostomy by Dr. Kendrick along with an appendectomy, vaginal pexy, and an umbilical herniorrhaphy.      A review of systems was done.  She denies cough, fever, shortness of breath, and loss of her sense of taste or smell  Constitutional: negative for fever, chills, activity change, appetite change, fatigue and unexpected weight change.  Respiratory: negative  Cardiovascular: negative  Gastrointestinal: negative  Genitourinary:negative  Musculoskeletal:negative  Behavioral/Psych: negative  Counseling/Anticipatory Guidance Discussed: nutrition, physical activity, healthy weight, injury prevention, screenings and self-breast exam    /72   Ht 157.5 cm (62\")   Wt 79.2 kg (174 lb 9.6 oz)   Breastfeeding No   BMI 31.93 kg/m²     MEDICALLY INDICATED   Lungs- Clear to auscultation  Heart- RRR with no murmur, rub or gallop.  Apical pulse is normal  Abdomen- Soft, non-- tender with no organomegaly     Clinical staff was present for exam  External genitalia:  normal appearance of the external genitalia including Bartholin's and [place]'s glands.  Vaginal:  atrophic mucosal changes are present;  Cervix:  absent.  Uterus:  absent.  Adnexa:  absent, bilateral.  Vaginal vault prolapse GRADE 2     The pessary was removed, cleaned and replaced.    Foldable ring w/ support - #6 w/o urethral bar          ASSESSMENT  Problems Addressed this Visit        Other    Vaginal vault prolapse after hysterectomy - Primary      Diagnoses       Codes Comments    Vaginal vault prolapse after " hysterectomy    -  Primary ICD-10-CM: N99.3  ICD-9-CM: 618.5           PLAN   1. Medications prescribed this encounter:  No orders of the defined types were placed in this encounter.  2. I have instructed the patient again in monthly self breast assessment  3. Follow up: 4 month(s)  *Please note that portions of this documentation may have been completed with a voice recognition program.  Efforts were made to edit this dictation, but occasional words may have been mistranscribed.    Post procedure instructions: Call ASAP if increasing pain or trouble passing urine or   bowels    Counseling was given to patient for the following topics: diagnostic results, instructions for management, risk factor reductions, prognosis, impressions and risks and benefits of treatment options . Total time of the encounter was 19 minute(s) and 10 minute(s)  was spent counseling.  I have advised the patient that the pessary is adequately elevating her vaginal vault prolapse.  I have counseled her to contact me should she have any bowel or urinary symptoms.  I have advised her that her previous colon surgery causes the pessary to deviate slightly to the right.  I have advised her to schedule breast imaging.      *Please note that portions of this documentation may have been completed with a voice recognition program.  Efforts were made to edit this dictation, but occasional words may have been mistranscribed.  This note was electronically signed.    VINICIUS Nicholson MD  January 5, 2021  15:36 EST

## 2021-05-05 ENCOUNTER — OFFICE VISIT (OUTPATIENT)
Dept: OBSTETRICS AND GYNECOLOGY | Facility: CLINIC | Age: 76
End: 2021-05-05

## 2021-05-05 VITALS
WEIGHT: 173.6 LBS | DIASTOLIC BLOOD PRESSURE: 80 MMHG | BODY MASS INDEX: 31.94 KG/M2 | HEIGHT: 62 IN | SYSTOLIC BLOOD PRESSURE: 130 MMHG

## 2021-05-05 DIAGNOSIS — Z01.419 ENCOUNTER FOR GYNECOLOGICAL EXAMINATION WITHOUT ABNORMAL FINDING: ICD-10-CM

## 2021-05-05 DIAGNOSIS — N99.3 VAGINAL VAULT PROLAPSE AFTER HYSTERECTOMY: Primary | ICD-10-CM

## 2021-05-05 PROCEDURE — 99213 OFFICE O/P EST LOW 20 MIN: CPT | Performed by: OBSTETRICS & GYNECOLOGY

## 2021-05-05 PROCEDURE — G0101 CA SCREEN;PELVIC/BREAST EXAM: HCPCS | Performed by: OBSTETRICS & GYNECOLOGY

## 2021-05-05 NOTE — PROGRESS NOTES
Chief Complaint   Patient presents with   • Gynecologic Exam   • Pessary Check     #6 ring with support       Jessica Durham is a 76 y.o. year old  presenting to be seen for her annual exam.  Patient presents for her annual exam with the Chronic problem of vaginal vault prolapse after hysterectomy.  This is managed successfully with a #6 ring pessary with support.  She has previously had a vaginal hysterectomy/bilateral salpingo-oophorectomy done elsewhere.  Dr. Moore did a low anterior resection, with an appendectomy, an umbilical herniorrhaphy, a vaginal pexy, and creation of a diverting colostomy.  She has had no recurrence of disease.  Following her colon resection and vaginal pexy she developed grade 2 vaginal vault prolapse.  She tolerates her #6 ring pessary with support well.  She has some constipation.  She denies urinary symptoms.    SCREENING TESTS    Year 2012 2016 2017   Age                         PAP                         HPV high risk                         Mammogram          benign               JOSE C score                         Breast MRI                         Lipids                         Vitamin D                         Colonoscopy                         DEXA  Frax (hip/any)                         Ovarian Screen                             She exercises regularly: no.  She wears her seat belt: yes.  She has concerns about domestic violence: no.  She has noticed changes in height: no    GYN screening history:  · Last mammogram: was done on approximately  and the result was: Birads I (Normal)..    No Additional Complaints Reported    The following portions of the patient's history were reviewed and updated as appropriate:vital signs and   She  has a past medical history of Arthritis, Gout, Hyperlipidemia, Hypertension, Osteoporosis, Pulmonary embolism (CMS/HCC), Rectal  prolapse, and Rectocele.  She does not have any pertinent problems on file.  She  has a past surgical history that includes Bladder repair; Eye surgery; Colonoscopy; Colectomy (N/A, 11/4/2019); Appendectomy; and vaginal hysterectomy salpingo oophorectomy (Bilateral).  Her family history includes Cancer in her sister; Colon cancer in her maternal grandmother; Diabetes in her father, paternal aunt, and paternal uncle; Heart disease in her mother; Hypertension in her mother; Osteoporosis in her mother; Other in her mother; Stroke in her father; Thyroid disease in her sister.  She  reports that she has quit smoking. She has never used smokeless tobacco. She reports that she does not drink alcohol and does not use drugs.  Current Outpatient Medications   Medication Sig Dispense Refill   • allopurinol (ZYLOPRIM) 300 MG tablet Take 150 mg by mouth Daily.     • aspirin 81 MG EC tablet Take 81 mg by mouth Every Night.     • Calcium Carbonate-Vitamin D (CALTRATE 600+D PO) Take  by mouth.     • docusate sodium (COLACE) 100 MG capsule Take 100 mg by mouth 2 (Two) Times a Day.     • esomeprazole (nexIUM) 20 MG capsule Take 20 mg by mouth Every Morning Before Breakfast.     • ferrous sulfate 325 (65 Fe) MG tablet Take 1 tablet by mouth Daily.     • losartan (COZAAR) 50 MG tablet      • Multiple Vitamins-Minerals (CENTRUM SILVER ADULT 50+ PO) Take 1 tablet by mouth Daily.     • Polyethylene Glycol 3350 (MIRALAX PO) Take  by mouth.     • simvastatin (ZOCOR) 20 MG tablet Take 20 mg by mouth Every Night.       No current facility-administered medications for this visit.     Facility-Administered Medications Ordered in Other Visits   Medication Dose Route Frequency Provider Last Rate Last Admin   • acetaminophen (TYLENOL) tablet 1,000 mg  1,000 mg Oral Once Don Moore MD       • gabapentin (NEURONTIN) capsule 600 mg  600 mg Oral Once Don Moore MD       • meloxicam (MOBIC) tablet 15 mg  15 mg Oral Once Don Moore  "MD         She has No Known Allergies..    Review of Systems  A review of systems was taken.  She denies cough, fever, shortness of breath, and loss of her sense of taste or smell  Constitutional: negative for fever, chills, activity change, appetite change, fatigue and unexpected weight change.  Respiratory: negative  Cardiovascular: negative  Gastrointestinal: positive for colostomy  Genitourinary:negative  Musculoskeletal:negative  Behavioral/Psych: negative     Counseling/Anticipatory Guidance Discussed: nutrition, physical activity, healthy weight, injury prevention, immunizations, screenings and self-breast exam      /80   Ht 157.5 cm (62\")   Wt 78.7 kg (173 lb 9.6 oz)   Breastfeeding No   BMI 31.75 kg/m²     MEDICALLY INDICATED   Physical Exam    General:  alert; cooperative; well developed; well nourished   Skin:  No suspicious lesions seen   Thyroid: normal to inspection and palpation   Lungs:  breathing is unlabored  clear to auscultation bilaterally   Heart:  regular rate and rhythm, S1, S2 normal, no murmur, click, rub or gallop  normal apical impulse   Breasts:  Examined in supine position  Symmetric without masses or skin dimpling  Nipples normal without inversion, lesions or discharge  There are no palpable axillary nodes   Abdomen: soft, non-tender; no masses  no umbilical or inguinal hernias are present  no hepato-splenomegaly  colostomy site normal   Pelvis: Clinical staff was present for exam  External genitalia:  normal appearance of the external genitalia including Bartholin's and [place]'s glands.  Vaginal:  normal pink mucosa without prolapse or lesions.  Cervix:  absent.  Uterus:  absent.  Adnexa:  absent, bilateral.  Rectal:  digital rectal exam not performed;  Vaginal vault prolapse GRADE 2                             The #6 ring pessary with support was removed, cleaned, and replaced without difficulty.  It was well-tolerated.      Lab Review   CBC results and CMP " results    Imaging  Mammogram results              ASSESSMENT  Problems Addressed this Visit        Genitourinary and Reproductive     Vaginal vault prolapse after hysterectomy - Primary      Other Visit Diagnoses     Encounter for gynecological examination without abnormal finding          Diagnoses       Codes Comments    Vaginal vault prolapse after hysterectomy    -  Primary ICD-10-CM: N99.3  ICD-9-CM: 618.5     Encounter for gynecological examination without abnormal finding     ICD-10-CM: Z01.419  ICD-9-CM: V72.31       Vaginal vault prolapse after hysterectomy is a chronic problem with a good prognosis with current pessary use.  I have counseled the patient to continue using the #6 ring pessary with support.  She desires for us to remove and replace it every 4 months.        Substance History:   reports that she has quit smoking. She has never used smokeless tobacco.   reports no history of alcohol use.   reports no history of drug use.    Substance use counseling is not indicated based on patient history.      PLAN    · Medications prescribed this encounter:  No orders of the defined types were placed in this encounter.  · Continue use of #6 ring pessary with support  · Calcium, 600 mg/ Vit. D, 400 IU daily; regular weight-bearing exercise  · Follow up: 4 month(s)  *Please note that portions of this documentation may have been completed with a voice recognition program.  Efforts were made to edit this dictation, but occasional words may have been mistranscribed.       This note was electronically signed.    VINICIUS Nicholson MD  May 5, 2021  15:09 EDT

## 2021-05-14 ENCOUNTER — TELEMEDICINE (OUTPATIENT)
Dept: SLEEP MEDICINE | Facility: HOSPITAL | Age: 76
End: 2021-05-14

## 2021-05-14 VITALS — HEIGHT: 62 IN | WEIGHT: 175 LBS | BODY MASS INDEX: 32.2 KG/M2

## 2021-05-14 DIAGNOSIS — G47.33 OBSTRUCTIVE SLEEP APNEA, ADULT: Primary | ICD-10-CM

## 2021-05-14 PROCEDURE — 99212 OFFICE O/P EST SF 10 MIN: CPT | Performed by: NURSE PRACTITIONER

## 2021-05-14 NOTE — PROGRESS NOTES
Chief Complaint:   Chief Complaint   Patient presents with   • Follow-up       HPI:    Jessica Durham is a 76 y.o. female here for follow-up of sleep apnea.  Patient was last seen 5/15/2020 for an AHI of 21.3.  Patient states she continues to do well with CPAP therapy.  She is sleeping 8 hours nightly and does feel refreshed upon awakening.  She goes to sleep within 15 to 30 minutes and does get up x3 to use the restroom.  Patient has an [place] score of 4/24.  Patient has no concerns or complaints regarding her CPAP therapy and wishes to continue.        Current medications are:   Current Outpatient Medications:   •  allopurinol (ZYLOPRIM) 300 MG tablet, Take 150 mg by mouth Daily., Disp: , Rfl:   •  aspirin 81 MG EC tablet, Take 81 mg by mouth Every Night., Disp: , Rfl:   •  Calcium Carbonate-Vitamin D (CALTRATE 600+D PO), Take  by mouth., Disp: , Rfl:   •  docusate sodium (COLACE) 100 MG capsule, Take 100 mg by mouth 2 (Two) Times a Day., Disp: , Rfl:   •  esomeprazole (nexIUM) 20 MG capsule, Take 20 mg by mouth Every Morning Before Breakfast., Disp: , Rfl:   •  ferrous sulfate 325 (65 Fe) MG tablet, Take 1 tablet by mouth Daily., Disp: , Rfl:   •  losartan (COZAAR) 50 MG tablet, , Disp: , Rfl:   •  Multiple Vitamins-Minerals (CENTRUM SILVER ADULT 50+ PO), Take 1 tablet by mouth Daily., Disp: , Rfl:   •  Polyethylene Glycol 3350 (MIRALAX PO), Take  by mouth., Disp: , Rfl:   •  simvastatin (ZOCOR) 20 MG tablet, Take 20 mg by mouth Every Night., Disp: , Rfl:   No current facility-administered medications for this visit.    Facility-Administered Medications Ordered in Other Visits:   •  acetaminophen (TYLENOL) tablet 1,000 mg, 1,000 mg, Oral, Once, Don Moore MD  •  gabapentin (NEURONTIN) capsule 600 mg, 600 mg, Oral, Once, Don Moore MD  •  meloxicam (MOBIC) tablet 15 mg, 15 mg, Oral, Once, Don Moore MD.      The patient's relevant past medical, surgical, family and social history were  reviewed and updated in Epic as appropriate.       Review of Systems   HENT: Positive for hearing loss, postnasal drip and tinnitus.    Eyes: Positive for visual disturbance.   Respiratory: Positive for apnea, cough and wheezing.    Gastrointestinal:        Heartburn   Genitourinary: Positive for frequency.   Musculoskeletal: Positive for back pain and joint swelling.   Psychiatric/Behavioral: Positive for sleep disturbance.   All other systems reviewed and are negative.        Objective:    Physical Exam  Constitutional:       Appearance: Normal appearance.   HENT:      Head: Normocephalic and atraumatic.      Mouth/Throat:      Comments: Class 3 airway  Pulmonary:      Effort: Pulmonary effort is normal. No respiratory distress.   Skin:     General: Skin is dry.      Coloration: Skin is not pale.   Neurological:      Mental Status: She is alert and oriented to person, place, and time.   Psychiatric:         Mood and Affect: Mood normal.         Behavior: Behavior normal.         Thought Content: Thought content normal.         Judgment: Judgment normal.       90/90 days of use  Greater than 4-hour use 100%  90% pressure 11.1  AHI of 3.5  Settings 8-18    ASSESSMENT/PLAN    Diagnoses and all orders for this visit:    1. Obstructive sleep apnea, adult (Primary)  -     CPAP Therapy            1. Counseled patient regarding multimodal approach with healthy nutrition, healthy sleep, regular physical activity, social activities, counseling, and medications. Encouraged to practice lateral sleep position. Avoid alcohol and sedatives close to bedtime.  2. Refill supplies x1 year.  Return to clinic 1 year or sooner symptoms warrant.  Patient gave verbal consent today for video visit.    I have reviewed the results of my evaluation and impression and discussed my recommendations in detail with the patient.      Signed by  KRISTIAN Perera    May 14, 2021      CC: Isabella Munroe MD          No ref. provider  found

## 2021-09-07 ENCOUNTER — OFFICE VISIT (OUTPATIENT)
Dept: OBSTETRICS AND GYNECOLOGY | Facility: CLINIC | Age: 76
End: 2021-09-07

## 2021-09-07 VITALS
BODY MASS INDEX: 32.13 KG/M2 | HEIGHT: 62 IN | SYSTOLIC BLOOD PRESSURE: 138 MMHG | DIASTOLIC BLOOD PRESSURE: 80 MMHG | WEIGHT: 174.6 LBS

## 2021-09-07 DIAGNOSIS — Z90.49 S/P PARTIAL COLECTOMY: ICD-10-CM

## 2021-09-07 DIAGNOSIS — Z78.0 MENOPAUSE: Primary | ICD-10-CM

## 2021-09-07 DIAGNOSIS — N99.3 VAGINAL VAULT PROLAPSE AFTER HYSTERECTOMY: ICD-10-CM

## 2021-09-07 PROCEDURE — 99213 OFFICE O/P EST LOW 20 MIN: CPT | Performed by: OBSTETRICS & GYNECOLOGY

## 2021-09-07 NOTE — PROGRESS NOTES
"Date of procedure: 9/7/2021  This patient had a vaginal hysterectomy/bilateral salpingo-oophorectomy elsewhere.  She saw Dr. Moore for a low anterior resection for colon cancer, and at the same time he did an appendectomy, created a diverting colostomy, and did a low anterior resection.  Following that she developed prolapse of the vaginal vault.  She preferred to be treated with a pessary and was fitted with a #6 ring pessary with support.  She has done well with this device.  She has no urinary symptoms.  Her colostomy is functioning well.  She has had Abundio-19 immunization.    Risks and benefits discussed? yes  All questions answered? yes      A review of systems was done.  Constitutional: negative for fever, chills, activity change, appetite change, fatigue and unexpected weight change.  Respiratory: negative  Cardiovascular: negative  Gastrointestinal: positive for colostomy  Genitourinary:negative  Musculoskeletal:negative  Behavioral/Psych: negative    Counseling/Anticipatory Guidance Discussed: nutrition, physical activity, healthy weight, immunizations, screenings and self-breast exam    /80   Ht 157.5 cm (62\")   Wt 79.2 kg (174 lb 9.6 oz)   Breastfeeding No   BMI 31.93 kg/m²     BMI reviewed     MEDICALLY INDICATED   Neuro - alert and oriented to person, place and time   Lungs- Clear to auscultation, breathing is unlabored  Heart- Apical pulse normal; RRR with no murmur, rub or gallop  Abdomen- Soft, non-tender with no organomegaly. Colostomy functioning     Clinical staff was present for exam  External genitalia:  normal appearance of the external genitalia including Bartholin's and [place]'s glands.  Vaginal:  atrophic mucosal changes are present;  Cervix:  absent.  Uterus:  absent.  Adnexa:  absent, bilateral.  Vaginal vault prolapse GRADE 2     The pessary was removed, cleaned and replaced.    Foldable ring w/ support - #6 w/o urethral bar      ASSESSMENT  Problems Addressed this Visit        " Gastrointestinal Abdominal     S/P partial colectomy       Genitourinary and Reproductive     Vaginal vault prolapse after hysterectomy    Menopause - Primary      Diagnoses       Codes Comments    Menopause    -  Primary ICD-10-CM: Z78.0  ICD-9-CM: 627.2     Vaginal vault prolapse after hysterectomy     ICD-10-CM: N99.3  ICD-9-CM: 618.5     S/P partial colectomy     ICD-10-CM: Z90.49  ICD-9-CM: V45.89       Vaginal vault prolapse after hysterectomy and after a vaginal pexy is a Chronic problem with a good prognosis with current treatment.  The patient will continue to be managed with a #6 ring pessary with support.  She was advised to use Replens vaginal moisturizer 3 times a week.    PLAN   1. Medications prescribed this encounter:  No orders of the defined types were placed in this encounter.  2. The pessary was removed, cleaned, and replaced  3. Mammograms in December 2021, monthly self breast assessment  4. Follow up: 4 month(s)  *Please note that portions of this documentation may have been completed with a voice recognition program.  Efforts were made to edit this dictation, but occasional words may have been mistranscribed.    Post procedure instructions: Call ASAP if increasing pain or trouble passing urine or   bowels    Counseling was given to patient for the following topics: diagnostic results, instructions for management, risk factor reductions, impressions and risks and benefits of treatment options.      *Please note that portions of this documentation may have been completed with a voice recognition program.  Efforts were made to edit this dictation, but occasional words may have been mistranscribed.  This note was electronically signed.    VINICIUS Nicholson MD  September 7, 2021  15:10 EDT

## 2022-01-28 ENCOUNTER — OFFICE VISIT (OUTPATIENT)
Dept: OBSTETRICS AND GYNECOLOGY | Facility: CLINIC | Age: 77
End: 2022-01-28

## 2022-01-28 VITALS
WEIGHT: 177 LBS | SYSTOLIC BLOOD PRESSURE: 150 MMHG | DIASTOLIC BLOOD PRESSURE: 80 MMHG | BODY MASS INDEX: 32.57 KG/M2 | HEIGHT: 62 IN

## 2022-01-28 DIAGNOSIS — Z46.89 PESSARY MAINTENANCE: Primary | ICD-10-CM

## 2022-01-28 PROCEDURE — 99213 OFFICE O/P EST LOW 20 MIN: CPT | Performed by: NURSE PRACTITIONER

## 2022-01-28 RX ORDER — SODIUM FLUORIDE 6 MG/ML
PASTE, DENTIFRICE DENTAL
COMMUNITY
Start: 2022-01-14

## 2022-01-28 RX ORDER — METOPROLOL SUCCINATE 25 MG/1
1 TABLET, EXTENDED RELEASE ORAL DAILY
COMMUNITY
Start: 2021-10-25

## 2022-01-28 RX ORDER — SIMVASTATIN 40 MG
1 TABLET ORAL DAILY
COMMUNITY
Start: 2021-11-05 | End: 2023-02-02 | Stop reason: ALTCHOICE

## 2022-01-28 NOTE — PROGRESS NOTES
Chief Complaint  Jessica Durham is a 77 y.o.  female presenting for Pessary Check (#6 ring with support)    History of Present Illness  Very pleasant, alert & articulate 78yo woman, longtime pt of Dr. Yeungs, here for pessary care.  States she is doing very well with the pessary.  Hasn't had any vag bldg, and it relieves the pressure.  She clarifies in hx that she has NOT ever had colon cancer.  The colostomy was done for other reasons.  Otherwise, ROS neg.    The following portions of the patient's history were reviewed and updated as appropriate: allergies, current medications, past family history, past medical history, past social history, past surgical history and problem list.    No Known Allergies      Current Outpatient Medications:   •  metoprolol succinate XL (TOPROL-XL) 25 MG 24 hr tablet, Take 1 tablet by mouth Daily., Disp: , Rfl:   •  simvastatin (ZOCOR) 40 MG tablet, Take 1 tablet by mouth Daily., Disp: , Rfl:   •  allopurinol (ZYLOPRIM) 300 MG tablet, Take 150 mg by mouth Daily., Disp: , Rfl:   •  aspirin 81 MG EC tablet, Take 81 mg by mouth Every Night., Disp: , Rfl:   •  Calcium Carbonate-Vitamin D (CALTRATE 600+D PO), Take  by mouth., Disp: , Rfl:   •  docusate sodium (COLACE) 100 MG capsule, Take 100 mg by mouth 2 (Two) Times a Day., Disp: , Rfl:   •  esomeprazole (nexIUM) 20 MG capsule, Take 20 mg by mouth Every Morning Before Breakfast., Disp: , Rfl:   •  ferrous sulfate 325 (65 Fe) MG tablet, Take 1 tablet by mouth Daily., Disp: , Rfl:   •  losartan (COZAAR) 50 MG tablet, , Disp: , Rfl:   •  Multiple Vitamins-Minerals (CENTRUM SILVER ADULT 50+ PO), Take 1 tablet by mouth Daily., Disp: , Rfl:   •  Polyethylene Glycol 3350 (MIRALAX PO), Take  by mouth., Disp: , Rfl:   •  Sodium Fluoride 5000 PPM 1.1 % paste, , Disp: , Rfl:   No current facility-administered medications for this visit.    Facility-Administered Medications Ordered in Other Visits:   •  acetaminophen (TYLENOL) tablet 1,000  "mg, 1,000 mg, Oral, Once, Don Moore MD  •  gabapentin (NEURONTIN) capsule 600 mg, 600 mg, Oral, Once, Don Moore MD  •  meloxicam (MOBIC) tablet 15 mg, 15 mg, Oral, Once, Don Moore MD    Past Medical History:   Diagnosis Date   • Arthritis    • Gout    • Hyperlipidemia    • Hypertension    • Osteoporosis    • Pulmonary embolism (HCC)    • Rectal prolapse    • Rectocele         Past Surgical History:   Procedure Laterality Date   • APPENDECTOMY     • BLADDER REPAIR     • COLON RESECTION N/A 11/4/2019    Procedure: PROCTOSIGMOID COLECTOMY, COLOSTOMY, APPENDECTOMY, REMOVAL OF PESSARY, VAGINOPEXY WITH INTRENSIC BRIDGE, UMBILICAL HERNIA REPAIR, AND OMENTUM FLAP;  Surgeon: Don Moore MD;  Location: [facility];  Service: General   • COLONOSCOPY     • EYE SURGERY      bilateral lens   • VAGINAL HYSTERECTOMY SALPINGO OOPHORECTOMY Bilateral        Objective  /80   Ht 157.5 cm (62\")   Wt 80.3 kg (177 lb)   Breastfeeding No   BMI 32.37 kg/m²     Physical Exam  Exam conducted with a chaperone present.   Constitutional:       Appearance: Normal appearance.   Abdominal:      Palpations: Abdomen is soft. There is no mass.      Tenderness: There is no abdominal tenderness.   Genitourinary:     General: Normal vulva.      Labia:         Right: No lesion.         Left: No lesion.       Vagina: Normal. No erythema.      Uterus: Absent.       Adnexa: Right adnexa normal and left adnexa normal.        Right: No mass or tenderness.          Left: No mass or tenderness.        Rectum: Normal.      Comments: Sm amt bldg occurred with the removal of pessary.  No signs of infection.  No denuded areas noted.  The pessary was cleaned & replaced.  She dianne very well.    Anus appears wnl.  (No rectal exam performed.)        Assessment/Plan   Diagnoses and all orders for this visit:    1. Pessary maintenance (Primary)        Procedures        Return in about 3 months (around 4/28/2022) for Annual " physical.    Gia Alanis, APRN  01/28/2022

## 2022-04-28 ENCOUNTER — OFFICE VISIT (OUTPATIENT)
Dept: OBSTETRICS AND GYNECOLOGY | Facility: CLINIC | Age: 77
End: 2022-04-28

## 2022-04-28 VITALS
SYSTOLIC BLOOD PRESSURE: 132 MMHG | DIASTOLIC BLOOD PRESSURE: 82 MMHG | BODY MASS INDEX: 31.43 KG/M2 | HEIGHT: 62 IN | WEIGHT: 170.8 LBS

## 2022-04-28 DIAGNOSIS — Z46.89 PESSARY MAINTENANCE: Primary | ICD-10-CM

## 2022-04-28 DIAGNOSIS — N81.9 FEMALE GENITAL PROLAPSE, UNSPECIFIED TYPE: ICD-10-CM

## 2022-04-28 PROCEDURE — 99212 OFFICE O/P EST SF 10 MIN: CPT | Performed by: NURSE PRACTITIONER

## 2022-04-28 NOTE — PROGRESS NOTES
Chief Complaint  Jessica Durham is a 77 y.o.  female presenting for Gynecologic Exam and Pessary Check (#6 ring with support.)    History of Present Illness  Very pleasant, alert & articulate 78yo woman.  She has taken a bad fall, and had to have surgery on her right shoulder.  She is finishing up with PT this week, but ROM is not very good yet.  No problems with the pessary.  No urinary problems.  She is s/p TVH/BSO.  States last mammo was Dec 2021 --- will try to obtain.    The following portions of the patient's history were reviewed and updated as appropriate: allergies, current medications, past family history, past medical history, past social history, past surgical history and problem list.    No Known Allergies      Current Outpatient Medications:   •  allopurinol (ZYLOPRIM) 300 MG tablet, Take 150 mg by mouth Daily., Disp: , Rfl:   •  aspirin 81 MG EC tablet, Take 81 mg by mouth Every Night., Disp: , Rfl:   •  Calcium Carbonate-Vitamin D (CALTRATE 600+D PO), Take  by mouth., Disp: , Rfl:   •  docusate sodium (COLACE) 100 MG capsule, Take 100 mg by mouth 2 (Two) Times a Day., Disp: , Rfl:   •  esomeprazole (nexIUM) 20 MG capsule, Take 20 mg by mouth Every Morning Before Breakfast., Disp: , Rfl:   •  ferrous sulfate 325 (65 Fe) MG tablet, Take 1 tablet by mouth Daily., Disp: , Rfl:   •  losartan (COZAAR) 50 MG tablet, , Disp: , Rfl:   •  metoprolol succinate XL (TOPROL-XL) 25 MG 24 hr tablet, Take 1 tablet by mouth Daily., Disp: , Rfl:   •  Multiple Vitamins-Minerals (CENTRUM SILVER ADULT 50+ PO), Take 1 tablet by mouth Daily., Disp: , Rfl:   •  Polyethylene Glycol 3350 (MIRALAX PO), Take  by mouth., Disp: , Rfl:   •  simvastatin (ZOCOR) 40 MG tablet, Take 1 tablet by mouth Daily., Disp: , Rfl:   •  Sodium Fluoride 5000 PPM 1.1 % paste, , Disp: , Rfl:   No current facility-administered medications for this visit.    Facility-Administered Medications Ordered in Other Visits:   •  acetaminophen  "(TYLENOL) tablet 1,000 mg, 1,000 mg, Oral, Once, Don Moore MD  •  gabapentin (NEURONTIN) capsule 600 mg, 600 mg, Oral, Once, Don Moore MD  •  meloxicam (MOBIC) tablet 15 mg, 15 mg, Oral, Once, Don Moore MD    Past Medical History:   Diagnosis Date   • Arthritis    • Gout    • Hyperlipidemia    • Hypertension    • Osteoporosis    • Pulmonary embolism (HCC)    • Rectal prolapse    • Rectocele         Past Surgical History:   Procedure Laterality Date   • APPENDECTOMY     • BLADDER REPAIR     • COLON RESECTION N/A 11/04/2019    Procedure: PROCTOSIGMOID COLECTOMY, COLOSTOMY, APPENDECTOMY, REMOVAL OF PESSARY, VAGINOPEXY WITH INTRENSIC BRIDGE, UMBILICAL HERNIA REPAIR, AND OMENTUM FLAP;  Surgeon: Don Moore MD;  Location: [facility];  Service: General   • COLONOSCOPY     • EYE SURGERY      bilateral lens   • TOTAL SHOULDER ARTHROPLASTY Right 03/02/2022   • VAGINAL HYSTERECTOMY SALPINGO OOPHORECTOMY Bilateral        Objective  /82   Ht 157.5 cm (62\")   Wt 77.5 kg (170 lb 12.8 oz)   Breastfeeding No   BMI 31.24 kg/m²     Physical Exam  Exam conducted with a chaperone present.   Constitutional:       Appearance: Normal appearance.   Abdominal:      General: Bowel sounds are normal.      Palpations: Abdomen is soft. There is no mass.      Tenderness: There is no abdominal tenderness.   Genitourinary:     General: Normal vulva.      Labia:         Right: No rash or lesion.         Left: No rash or lesion.       Vagina: Signs of injury present. No erythema or bleeding.      Uterus: Absent.       Adnexa:         Right: No mass or tenderness.          Left: No mass or tenderness.        Rectum: Normal.      Comments: Posterior and lateral fornices are denuded and removing the pessary caused just a tiny bit of bleeding. No abnormal vaginal discharge.    Anus appears wnl.  (No rectal exam performed.)        Assessment/Plan   Diagnoses and all orders for this visit:    1. Pessary maintenance " (Primary)    2. Female genital prolapse, unspecified type        Procedures    40 to 64: Counseling/Anticipatory Guidance Discussed: self-breast exam        Return in about 3 months (around 7/28/2022) for Recheck pessary care, GET last JOSEPH.    Gia Alanis, APRN  04/28/2022

## 2022-05-16 ENCOUNTER — OFFICE VISIT (OUTPATIENT)
Dept: SLEEP MEDICINE | Facility: HOSPITAL | Age: 77
End: 2022-05-16

## 2022-05-16 VITALS
HEART RATE: 71 BPM | HEIGHT: 62 IN | SYSTOLIC BLOOD PRESSURE: 131 MMHG | RESPIRATION RATE: 16 BRPM | WEIGHT: 167.8 LBS | DIASTOLIC BLOOD PRESSURE: 62 MMHG | OXYGEN SATURATION: 95 % | BODY MASS INDEX: 30.88 KG/M2

## 2022-05-16 DIAGNOSIS — G47.33 OSA (OBSTRUCTIVE SLEEP APNEA): Primary | ICD-10-CM

## 2022-05-16 PROCEDURE — 99213 OFFICE O/P EST LOW 20 MIN: CPT | Performed by: NURSE PRACTITIONER

## 2022-05-16 NOTE — PROGRESS NOTES
Chief Complaint:   Chief Complaint   Patient presents with   • Follow-up       HPI:    Jessica Durham is a 77 y.o. female here for follow-up of sleep apnea.  Patient was last seen 5/14/2021.  Patient is sleeping 8 hours nightly and does feel rested upon awakening.  She will go to sleep within 15 to 30 minutes and does get up x3 to use the restroom.  Patient does not have difficulty going back to sleep.  Patient has an [place] score of 4/24.  We did discuss today 30 days of use on the last 90.  Patient states on March 1 she did fall and break her right shoulder.  She was then in hospital for shoulder replacement surgery.  When patient went home she did sleep downstairs in the recliner for comfort has only recently gone back upstairs to wear her CPAP machine ends.  Patient is glad to be back on CPAP and feels it is beneficial.        Current medications are:   Current Outpatient Medications:   •  allopurinol (ZYLOPRIM) 300 MG tablet, Take 150 mg by mouth Daily., Disp: , Rfl:   •  aspirin 81 MG EC tablet, Take 81 mg by mouth Every Night., Disp: , Rfl:   •  Calcium Carbonate-Vitamin D (CALTRATE 600+D PO), Take  by mouth., Disp: , Rfl:   •  docusate sodium (COLACE) 100 MG capsule, Take 100 mg by mouth 2 (Two) Times a Day., Disp: , Rfl:   •  esomeprazole (nexIUM) 20 MG capsule, Take 20 mg by mouth Every Morning Before Breakfast., Disp: , Rfl:   •  ferrous sulfate 325 (65 Fe) MG tablet, Take 1 tablet by mouth Daily., Disp: , Rfl:   •  losartan (COZAAR) 50 MG tablet, , Disp: , Rfl:   •  metoprolol succinate XL (TOPROL-XL) 25 MG 24 hr tablet, Take 1 tablet by mouth Daily., Disp: , Rfl:   •  Multiple Vitamins-Minerals (CENTRUM SILVER ADULT 50+ PO), Take 1 tablet by mouth Daily., Disp: , Rfl:   •  Polyethylene Glycol 3350 (MIRALAX PO), Take  by mouth., Disp: , Rfl:   •  simvastatin (ZOCOR) 40 MG tablet, Take 1 tablet by mouth Daily., Disp: , Rfl:   •  Sodium Fluoride 5000 PPM 1.1 % paste, , Disp: , Rfl:   No current  facility-administered medications for this visit.    Facility-Administered Medications Ordered in Other Visits:   •  acetaminophen (TYLENOL) tablet 1,000 mg, 1,000 mg, Oral, Once, Don Moore MD  •  gabapentin (NEURONTIN) capsule 600 mg, 600 mg, Oral, Once, Don Moore MD  •  meloxicam (MOBIC) tablet 15 mg, 15 mg, Oral, Once, Don Moore MD.      The patient's relevant past medical, surgical, family and social history were reviewed and updated in Epic as appropriate.       Review of Systems   HENT: Positive for hearing loss, postnasal drip and tinnitus.    Eyes: Positive for visual disturbance.   Respiratory: Positive for apnea, cough and wheezing.    Gastrointestinal:        Heartburn   Genitourinary: Positive for frequency.   Musculoskeletal: Positive for arthralgias, back pain and joint swelling.   Psychiatric/Behavioral: Positive for sleep disturbance.   All other systems reviewed and are negative.        Objective:    Physical Exam  Constitutional:       Appearance: Normal appearance.   HENT:      Head: Normocephalic and atraumatic.      Mouth/Throat:      Comments: Class 3 airway  Cardiovascular:      Rate and Rhythm: Normal rate and regular rhythm.   Pulmonary:      Effort: Pulmonary effort is normal.      Breath sounds: Normal breath sounds.   Skin:     General: Skin is warm and dry.   Neurological:      Mental Status: She is alert and oriented to person, place, and time.   Psychiatric:         Mood and Affect: Mood normal.         Behavior: Behavior normal.         Thought Content: Thought content normal.         Judgment: Judgment normal.       30/90 days of use  Greater than 4-hour use 33.3  90% pressure 10.0  AHI of 3.9  Settings 8-18    ASSESSMENT/PLAN    Diagnoses and all orders for this visit:    1. ABHISHEK (obstructive sleep apnea) (Primary)  -     PAP Therapy            1. Counseled patient regarding multimodal approach with healthy nutrition, healthy sleep, regular physical activity,  social activities, counseling, and medications. Encouraged to practice lateral sleep position. Avoid alcohol and sedatives close to bedtime.  2. Refill supplies x1 year.  Return to clinic 1 year or sooner symptoms warrant.    I have reviewed the results of my evaluation and impression and discussed my recommendations in detail with the patient.      Signed by  Kim Clark, KRISTIAN    May 16, 2022      CC: Isabella Munroe MD          No ref. provider found

## 2022-07-28 ENCOUNTER — OFFICE VISIT (OUTPATIENT)
Dept: OBSTETRICS AND GYNECOLOGY | Facility: CLINIC | Age: 77
End: 2022-07-28

## 2022-07-28 VITALS
BODY MASS INDEX: 31.1 KG/M2 | DIASTOLIC BLOOD PRESSURE: 72 MMHG | WEIGHT: 169 LBS | SYSTOLIC BLOOD PRESSURE: 116 MMHG | HEIGHT: 62 IN

## 2022-07-28 DIAGNOSIS — Z46.89 PESSARY MAINTENANCE: Primary | ICD-10-CM

## 2022-07-28 DIAGNOSIS — T81.89XD POSTOPERATIVE PROLAPSE OF VAGINAL WALL, SUBSEQUENT ENCOUNTER: ICD-10-CM

## 2022-07-28 DIAGNOSIS — N81.10 POSTOPERATIVE PROLAPSE OF VAGINAL WALL, SUBSEQUENT ENCOUNTER: ICD-10-CM

## 2022-07-28 PROCEDURE — 99213 OFFICE O/P EST LOW 20 MIN: CPT | Performed by: NURSE PRACTITIONER

## 2022-07-28 NOTE — PROGRESS NOTES
Chief Complaint  Jessica Durham is a 77 y.o.  female presenting for Pessary Check (#6 ring with support.)    History of Present Illness  Mrs. Lopez is a very pleasant 78 yo woman, here today for pessary care.  She denies any bldg or abn vag discharge.  It relieves all her pressure from the prolapsed pelvic organs.  No dysuria.  No bowel changes.    The following portions of the patient's history were reviewed and updated as appropriate: allergies, current medications, past family history, past medical history, past social history, past surgical history and problem list.    No Known Allergies      Current Outpatient Medications:   •  COLLAGEN PO, Take 1 tablet by mouth Daily., Disp: , Rfl:   •  allopurinol (ZYLOPRIM) 300 MG tablet, Take 150 mg by mouth Daily., Disp: , Rfl:   •  aspirin 81 MG EC tablet, Take 81 mg by mouth Every Night., Disp: , Rfl:   •  Calcium Carbonate-Vitamin D (CALTRATE 600+D PO), Take  by mouth., Disp: , Rfl:   •  docusate sodium (COLACE) 100 MG capsule, Take 100 mg by mouth 2 (Two) Times a Day., Disp: , Rfl:   •  esomeprazole (nexIUM) 20 MG capsule, Take 20 mg by mouth Every Morning Before Breakfast., Disp: , Rfl:   •  ferrous sulfate 325 (65 Fe) MG tablet, Take 1 tablet by mouth Daily., Disp: , Rfl:   •  losartan (COZAAR) 50 MG tablet, , Disp: , Rfl:   •  metoprolol succinate XL (TOPROL-XL) 25 MG 24 hr tablet, Take 1 tablet by mouth Daily., Disp: , Rfl:   •  Multiple Vitamins-Minerals (CENTRUM SILVER ADULT 50+ PO), Take 1 tablet by mouth Daily., Disp: , Rfl:   •  Polyethylene Glycol 3350 (MIRALAX PO), Take  by mouth., Disp: , Rfl:   •  simvastatin (ZOCOR) 40 MG tablet, Take 1 tablet by mouth Daily., Disp: , Rfl:   •  Sodium Fluoride 5000 PPM 1.1 % paste, , Disp: , Rfl:   No current facility-administered medications for this visit.    Facility-Administered Medications Ordered in Other Visits:   •  acetaminophen (TYLENOL) tablet 1,000 mg, 1,000 mg, Oral, Once, Don Moore MD  •   "gabapentin (NEURONTIN) capsule 600 mg, 600 mg, Oral, Once, Don Moore MD  •  meloxicam (MOBIC) tablet 15 mg, 15 mg, Oral, Once, Don Moore MD    Past Medical History:   Diagnosis Date   • Arthritis    • Gout    • Hyperlipidemia    • Hypertension    • Osteoporosis    • Pulmonary embolism (HCC)    • Rectal prolapse    • Rectocele         Past Surgical History:   Procedure Laterality Date   • APPENDECTOMY     • BLADDER REPAIR     • COLON RESECTION N/A 11/04/2019    Procedure: PROCTOSIGMOID COLECTOMY, COLOSTOMY, APPENDECTOMY, REMOVAL OF PESSARY, VAGINOPEXY WITH INTRENSIC BRIDGE, UMBILICAL HERNIA REPAIR, AND OMENTUM FLAP;  Surgeon: Don Moore MD;  Location: [facility];  Service: General   • COLONOSCOPY     • EYE SURGERY      bilateral lens   • TOTAL SHOULDER ARTHROPLASTY Right 03/02/2022   • VAGINAL HYSTERECTOMY SALPINGO OOPHORECTOMY Bilateral        Objective  /72   Ht 157.5 cm (62\")   Wt 76.7 kg (169 lb)   Breastfeeding No   BMI 30.91 kg/m²     Physical Exam  Exam conducted with a chaperone present.   Constitutional:       Appearance: Normal appearance.   Abdominal:      General: Bowel sounds are normal.      Palpations: Abdomen is soft. There is no mass.      Tenderness: There is no abdominal tenderness.   Genitourinary:     General: Normal vulva.      Labia:         Right: No rash, tenderness or lesion.         Left: No rash, tenderness or lesion.       Vagina: Normal. No erythema.      Uterus: Absent.       Comments: Only small amt denuded area in the left fornix.    Silver nitrate used briefly.  Pessary was gently removed, cleaned, and replaced.  She tolerated well.  Anus appears wnl.  (No rectal exam performed.)        Assessment/Plan   Diagnoses and all orders for this visit:    1. Pessary maintenance (Primary)    2. Postoperative prolapse of vaginal wall, subsequent encounter        Procedures            Return in about 3 months (around 10/28/2022) for Annual physical.    Gia" Mery Alanis, APRN  07/28/2022

## 2022-11-01 ENCOUNTER — OFFICE VISIT (OUTPATIENT)
Dept: OBSTETRICS AND GYNECOLOGY | Facility: CLINIC | Age: 77
End: 2022-11-01

## 2022-11-01 VITALS
BODY MASS INDEX: 29.77 KG/M2 | HEIGHT: 62 IN | WEIGHT: 161.8 LBS | DIASTOLIC BLOOD PRESSURE: 80 MMHG | SYSTOLIC BLOOD PRESSURE: 126 MMHG

## 2022-11-01 DIAGNOSIS — Z01.411 ENCOUNTER FOR GYNECOLOGICAL EXAMINATION WITH ABNORMAL FINDING: Primary | ICD-10-CM

## 2022-11-01 DIAGNOSIS — N81.9 FEMALE GENITAL PROLAPSE, UNSPECIFIED TYPE: ICD-10-CM

## 2022-11-01 DIAGNOSIS — Z46.89 PESSARY MAINTENANCE: ICD-10-CM

## 2022-11-01 PROCEDURE — 99397 PER PM REEVAL EST PAT 65+ YR: CPT | Performed by: NURSE PRACTITIONER

## 2022-11-01 RX ORDER — ALUMINUM HYDROXIDE, MAGNESIUM HYDROXIDE, DIMETHICONE 400; 400; 40 MG/5ML; MG/5ML; MG/5ML
1 LIQUID ORAL DAILY
COMMUNITY

## 2022-11-01 NOTE — PROGRESS NOTES
Chief Complaint  Jessica Durham is a 77 y.o.  female presenting for Gynecologic Exam and Pessary Check (#6 ring with support.)    History of Present Illness  Mrs. Lopez is a very pleasant, alert & articulate, 78yo woman, , here for annual gyn exam & pessary care.  She has been hospitalized recently for acute pancreatitis (due to gallstone).  She is doing well now.  Extensive surgical hx noted below.  She denies any problems with the pessary.  HTN well controlled.      The following portions of the patient's history were reviewed and updated as appropriate: allergies, current medications, past family history, past medical history, past social history, past surgical history and problem list.    No Known Allergies      Current Outpatient Medications:   •  allopurinol (ZYLOPRIM) 300 MG tablet, Take 150 mg by mouth Daily., Disp: , Rfl:   •  aspirin 81 MG EC tablet, Take 81 mg by mouth Every Night., Disp: , Rfl:   •  docusate sodium (COLACE) 100 MG capsule, Take 100 mg by mouth 2 (Two) Times a Day., Disp: , Rfl:   •  esomeprazole (nexIUM) 20 MG capsule, Take 20 mg by mouth Every Morning Before Breakfast., Disp: , Rfl:   •  Lactobacillus Rhamnosus, GG, ( Probiotic Digestive Care) capsule, Take 1 capsule by mouth Daily., Disp: , Rfl:   •  losartan (COZAAR) 50 MG tablet, , Disp: , Rfl:   •  metoprolol succinate XL (TOPROL-XL) 25 MG 24 hr tablet, Take 1 tablet by mouth Daily., Disp: , Rfl:   •  Polyethylene Glycol 3350 (MIRALAX PO), Take  by mouth., Disp: , Rfl:   •  simvastatin (ZOCOR) 40 MG tablet, Take 1 tablet by mouth Daily., Disp: , Rfl:   •  Sodium Fluoride 5000 PPM 1.1 % paste, , Disp: , Rfl:   No current facility-administered medications for this visit.    Facility-Administered Medications Ordered in Other Visits:   •  acetaminophen (TYLENOL) tablet 1,000 mg, 1,000 mg, Oral, Once, Don Moore MD  •  gabapentin (NEURONTIN) capsule 600 mg, 600 mg, Oral, Once, Don Moore MD  •  meloxicam  "(MOBIC) tablet 15 mg, 15 mg, Oral, Once, Don Moore MD    Past Medical History:   Diagnosis Date   • Acute pancreatitis due to infection     gall stone blocking bile duct   • Arthritis    • Gout    • Hyperlipidemia    • Hypertension    • Osteoporosis    • Pulmonary embolism (HCC)    • Rectal prolapse    • Rectocele         Past Surgical History:   Procedure Laterality Date   • APPENDECTOMY     • BLADDER REPAIR     • COLON RESECTION N/A 11/04/2019    Procedure: PROCTOSIGMOID COLECTOMY, COLOSTOMY, APPENDECTOMY, REMOVAL OF PESSARY, VAGINOPEXY WITH INTRENSIC BRIDGE, UMBILICAL HERNIA REPAIR, AND OMENTUM FLAP;  Surgeon: Don Moore MD;  Location: [facility];  Service: General   • COLONOSCOPY     • EYE SURGERY      bilateral lens   • TOTAL SHOULDER ARTHROPLASTY Right 03/02/2022   • VAGINAL HYSTERECTOMY SALPINGO OOPHORECTOMY Bilateral        Objective  /80   Ht 157.5 cm (62\")   Wt 73.4 kg (161 lb 12.8 oz)   Breastfeeding No   BMI 29.59 kg/m²     Physical Exam  Vitals and nursing note reviewed. Exam conducted with a chaperone present.   Constitutional:       Appearance: Normal appearance.   HENT:      Head: Normocephalic.   Neck:      Thyroid: No thyroid mass or thyromegaly.   Cardiovascular:      Rate and Rhythm: Normal rate and regular rhythm.      Heart sounds: Normal heart sounds. No murmur heard.  Pulmonary:      Effort: Pulmonary effort is normal. No respiratory distress.      Breath sounds: Normal breath sounds.   Chest:   Breasts:     Right: No inverted nipple, mass or nipple discharge.      Left: No inverted nipple, mass or nipple discharge.   Abdominal:      Palpations: Abdomen is soft. There is no mass.      Tenderness: There is no abdominal tenderness.      Comments: Stoma/ bag noted (stoma is pink / healthy in appearance)   Genitourinary:     General: Normal vulva.      Labia:         Right: No rash, tenderness or lesion.         Left: No rash, tenderness or lesion.       Vagina: No " erythema.      Uterus: Absent.       Adnexa:         Right: No mass or tenderness.          Left: No mass or tenderness.        Comments: Anus appears wnl.  No rectal exam performed.  Lymphadenopathy:      Upper Body:      Right upper body: No supraclavicular or axillary adenopathy.      Left upper body: No supraclavicular or axillary adenopathy.   Neurological:      Mental Status: She is alert and oriented to person, place, and time.   Psychiatric:         Mood and Affect: Mood normal.         Behavior: Behavior normal.         Assessment/Plan   Diagnoses and all orders for this visit:    1. Encounter for gynecological examination with abnormal finding (Primary)    2. Female genital prolapse, unspecified type    3. Pessary maintenance    Pessary was removed, cleaned, and replaced.    No infections or abnormal discharge noted.      Procedures    40 to 64: Counseling/Anticipatory Guidance Discussed: nutrition, physical activity, screenings and self-breast exam    Return in about 3 months (around 2/1/2023) for Recheck pessary care.    Gia Alanis, KRISTIAN  11/01/2022

## 2023-02-02 ENCOUNTER — OFFICE VISIT (OUTPATIENT)
Dept: OBSTETRICS AND GYNECOLOGY | Facility: CLINIC | Age: 78
End: 2023-02-02
Payer: MEDICARE

## 2023-02-02 VITALS
SYSTOLIC BLOOD PRESSURE: 128 MMHG | BODY MASS INDEX: 29.3 KG/M2 | DIASTOLIC BLOOD PRESSURE: 80 MMHG | WEIGHT: 159.2 LBS | HEIGHT: 62 IN

## 2023-02-02 DIAGNOSIS — N81.9 FEMALE GENITAL PROLAPSE, UNSPECIFIED TYPE: Primary | ICD-10-CM

## 2023-02-02 DIAGNOSIS — Z46.89 PESSARY MAINTENANCE: ICD-10-CM

## 2023-02-02 PROCEDURE — 99213 OFFICE O/P EST LOW 20 MIN: CPT | Performed by: NURSE PRACTITIONER

## 2023-02-02 RX ORDER — ROSUVASTATIN CALCIUM 40 MG/1
1 TABLET, COATED ORAL DAILY
COMMUNITY
Start: 2022-12-13

## 2023-02-02 NOTE — PROGRESS NOTES
Chief Complaint  Jessica Durham is a 78 y.o.  female presenting for Pessary Check (#6 ring with support.)    History of Present Illness  Mrs. Durham is a very pleasant, alert & articulate 79yo woman, , here for pessary care.  Denies any vag bldg, urinary problems, or bowel problems in the past 3 months.  (Her stoma is healthy / she does well with the care.)      The following portions of the patient's history were reviewed and updated as appropriate: allergies, current medications, past family history, past medical history, past social history, past surgical history and problem list.    No Known Allergies      Current Outpatient Medications:   •  allopurinol (ZYLOPRIM) 300 MG tablet, Take 150 mg by mouth Daily., Disp: , Rfl:   •  aspirin 81 MG EC tablet, Take 81 mg by mouth Every Night., Disp: , Rfl:   •  docusate sodium (COLACE) 100 MG capsule, Take 100 mg by mouth 2 (Two) Times a Day., Disp: , Rfl:   •  esomeprazole (nexIUM) 20 MG capsule, Take 20 mg by mouth Every Morning Before Breakfast., Disp: , Rfl:   •  Lactobacillus Rhamnosus, GG, ( Probiotic Digestive Care) capsule, Take 1 capsule by mouth Daily., Disp: , Rfl:   •  losartan (COZAAR) 50 MG tablet, , Disp: , Rfl:   •  metoprolol succinate XL (TOPROL-XL) 25 MG 24 hr tablet, Take 1 tablet by mouth Daily., Disp: , Rfl:   •  Polyethylene Glycol 3350 (MIRALAX PO), Take  by mouth., Disp: , Rfl:   •  rosuvastatin (CRESTOR) 40 MG tablet, Take 1 tablet by mouth Daily., Disp: , Rfl:   •  Sodium Fluoride 5000 PPM 1.1 % paste, , Disp: , Rfl:   No current facility-administered medications for this visit.    Facility-Administered Medications Ordered in Other Visits:   •  acetaminophen (TYLENOL) tablet 1,000 mg, 1,000 mg, Oral, Once, Don Moore MD  •  gabapentin (NEURONTIN) capsule 600 mg, 600 mg, Oral, Once, Don Moore MD  •  meloxicam (MOBIC) tablet 15 mg, 15 mg, Oral, Once, Don Moore MD    Past Medical History:   Diagnosis Date   •  "Acute pancreatitis due to infection     gall stone blocking bile duct   • Arthritis    • Gout    • Hyperlipidemia    • Hypertension    • Incisional hernia    • Osteoporosis    • Parastomal hernia    • Pulmonary embolism (HCC)    • Rectal prolapse    • Rectocele         Past Surgical History:   Procedure Laterality Date   • APPENDECTOMY     • BLADDER REPAIR     • COLON RESECTION N/A 11/04/2019    Procedure: PROCTOSIGMOID COLECTOMY, COLOSTOMY, APPENDECTOMY, REMOVAL OF PESSARY, VAGINOPEXY WITH INTRENSIC BRIDGE, UMBILICAL HERNIA REPAIR, AND OMENTUM FLAP;  Surgeon: Don Moore MD;  Location: [facility];  Service: General   • COLONOSCOPY     • EYE SURGERY      bilateral lens   • TOTAL SHOULDER ARTHROPLASTY Right 03/02/2022   • VAGINAL HYSTERECTOMY SALPINGO OOPHORECTOMY Bilateral        Objective  /80   Ht 157.5 cm (62\")   Wt 72.2 kg (159 lb 3.2 oz)   Breastfeeding No   BMI 29.12 kg/m²     Physical Exam  Vitals and nursing note reviewed. Exam conducted with a chaperone present.   Constitutional:       Appearance: Normal appearance.   Abdominal:      Palpations: Abdomen is soft. There is no mass.      Tenderness: There is no abdominal tenderness.   Genitourinary:     General: Normal vulva.      Labia:         Right: No rash, tenderness or lesion.         Left: No rash, tenderness or lesion.       Vagina: Prolapsed vaginal walls present. No vaginal discharge or erythema.      Uterus: Absent.       Adnexa:         Right: No mass or tenderness.          Left: No mass or tenderness.        Comments: One area of denuded vaginal mucosa in the apex of vagina (silver nit used); the pessary was cleaned with Hibiclens and gently replaced.  She tolerated very well.  Skin:     General: Skin is warm and dry.   Neurological:      Mental Status: She is alert and oriented to person, place, and time.   Psychiatric:         Mood and Affect: Mood normal.         Behavior: Behavior normal.         Assessment/Plan   Diagnoses and " all orders for this visit:    1. Female genital prolapse, unspecified type (Primary)    2. Pessary maintenance        Procedures            Return in about 3 months (around 5/2/2023) for Recheck pessary care 3 mo.    Gia Alanis, KRISTIAN  02/02/2023

## 2023-05-02 ENCOUNTER — OFFICE VISIT (OUTPATIENT)
Dept: OBSTETRICS AND GYNECOLOGY | Facility: CLINIC | Age: 78
End: 2023-05-02
Payer: MEDICARE

## 2023-05-02 VITALS
SYSTOLIC BLOOD PRESSURE: 128 MMHG | BODY MASS INDEX: 28.52 KG/M2 | DIASTOLIC BLOOD PRESSURE: 78 MMHG | WEIGHT: 155 LBS | HEIGHT: 62 IN

## 2023-05-02 DIAGNOSIS — R23.8 DENUDED SKIN: ICD-10-CM

## 2023-05-02 DIAGNOSIS — N81.10 VAGINAL WALL PROLAPSE: ICD-10-CM

## 2023-05-02 DIAGNOSIS — Z46.89 PESSARY MAINTENANCE: Primary | ICD-10-CM

## 2023-05-02 NOTE — PROGRESS NOTES
Chief Complaint  Jessica Durham is a 78 y.o.  female presenting for Pessary Check (#6 ring with support.  Patient states that she has occasional red spotting on her pad.)    History of Present Illness  Jessica is a 77yo woman, .  Here for pessary care. She is s/p vag hysterectomy with BSO.  She has had just occasional spot of bldg in past month.  No vaginitis sx.  No pelvic or abd pain.  No dysuria.     The following portions of the patient's history were reviewed and updated as appropriate: allergies, current medications, past family history, past medical history, past social history, past surgical history and problem list.    No Known Allergies      Current Outpatient Medications:   •  allopurinol (ZYLOPRIM) 300 MG tablet, Take 150 mg by mouth Daily., Disp: , Rfl:   •  aspirin 81 MG EC tablet, Take 81 mg by mouth Every Night., Disp: , Rfl:   •  docusate sodium (COLACE) 100 MG capsule, Take 100 mg by mouth 2 (Two) Times a Day., Disp: , Rfl:   •  esomeprazole (nexIUM) 20 MG capsule, Take 20 mg by mouth Every Morning Before Breakfast., Disp: , Rfl:   •  Lactobacillus Rhamnosus, GG, ( Probiotic Digestive Care) capsule, Take 1 capsule by mouth Daily., Disp: , Rfl:   •  losartan (COZAAR) 50 MG tablet, , Disp: , Rfl:   •  metoprolol succinate XL (TOPROL-XL) 25 MG 24 hr tablet, Take 1 tablet by mouth Daily., Disp: , Rfl:   •  Polyethylene Glycol 3350 (MIRALAX PO), Take  by mouth., Disp: , Rfl:   •  rosuvastatin (CRESTOR) 40 MG tablet, Take 1 tablet by mouth Daily., Disp: , Rfl:   •  Sodium Fluoride 5000 PPM 1.1 % paste, , Disp: , Rfl:   No current facility-administered medications for this visit.    Facility-Administered Medications Ordered in Other Visits:   •  acetaminophen (TYLENOL) tablet 1,000 mg, 1,000 mg, Oral, Once, Don Moore MD  •  gabapentin (NEURONTIN) capsule 600 mg, 600 mg, Oral, Once, Don Moore MD  •  meloxicam (MOBIC) tablet 15 mg, 15 mg, Oral, Once, Don Moore MD    Past  "Medical History:   Diagnosis Date   • Acute pancreatitis due to infection     gall stone blocking bile duct   • Arthritis    • Gout    • Hyperlipidemia    • Hypertension    • Incisional hernia    • Osteoporosis    • Parastomal hernia    • Pulmonary embolism    • Rectal prolapse    • Rectocele         Past Surgical History:   Procedure Laterality Date   • APPENDECTOMY     • BLADDER REPAIR     • COLON RESECTION N/A 11/04/2019    Procedure: PROCTOSIGMOID COLECTOMY, COLOSTOMY, APPENDECTOMY, REMOVAL OF PESSARY, VAGINOPEXY WITH INTRENSIC BRIDGE, UMBILICAL HERNIA REPAIR, AND OMENTUM FLAP;  Surgeon: Don Moore MD;  Location: [facility];  Service: General   • COLONOSCOPY     • EYE SURGERY      bilateral lens   • TOTAL SHOULDER ARTHROPLASTY Right 03/02/2022   • VAGINAL HYSTERECTOMY SALPINGO OOPHORECTOMY Bilateral        Objective  /78   Ht 157.5 cm (62\")   Wt 70.3 kg (155 lb)   Breastfeeding No   BMI 28.35 kg/m²     Physical Exam  Vitals and nursing note reviewed. Exam conducted with a chaperone present.   Constitutional:       General: She is not in acute distress.     Appearance: Normal appearance. She is not ill-appearing.   Abdominal:      Palpations: Abdomen is soft. There is no mass.      Tenderness: There is no abdominal tenderness.   Genitourinary:     General: Normal vulva.      Labia:         Right: No rash, tenderness or lesion.         Left: No rash, tenderness or lesion.       Vagina: Bleeding and prolapsed vaginal walls present. No vaginal discharge or erythema.      Uterus: Absent.       Adnexa:         Right: No mass or tenderness.          Left: No mass or tenderness.        Comments: Poor vaginal tone;  Denuded area at 2:00-3:00 lateral wall.  Silver nitrate used.  The pessary was gently removed, cleaned with Hibiclens, and gently replaced after the vaginal inspection & Tx.    Anus appears wnl.  (No rectal exam performed.)  Skin:     General: Skin is warm and dry.   Neurological:      Mental " Status: She is alert and oriented to person, place, and time.   Psychiatric:         Mood and Affect: Mood normal.         Behavior: Behavior normal.         Assessment/Plan   Diagnoses and all orders for this visit:    1. Pessary maintenance (Primary)    2. Denuded skin    3. Vaginal wall prolapse        Procedures            Return in about 3 months (around 8/2/2023) for Recheck (pessary care).    Gia Alanis, KRISTIAN  05/02/2023

## 2023-05-15 ENCOUNTER — OFFICE VISIT (OUTPATIENT)
Dept: SLEEP MEDICINE | Facility: HOSPITAL | Age: 78
End: 2023-05-15
Payer: MEDICARE

## 2023-05-15 VITALS
OXYGEN SATURATION: 92 % | HEIGHT: 62 IN | WEIGHT: 136.2 LBS | BODY MASS INDEX: 25.06 KG/M2 | DIASTOLIC BLOOD PRESSURE: 56 MMHG | HEART RATE: 89 BPM | SYSTOLIC BLOOD PRESSURE: 117 MMHG

## 2023-05-15 DIAGNOSIS — G47.33 OSA (OBSTRUCTIVE SLEEP APNEA): Primary | ICD-10-CM

## 2023-05-15 NOTE — PROGRESS NOTES
Chief Complaint:   Chief Complaint   Patient presents with   • Follow-up       HPI:    Jessica Durham is a 78 y.o. female here for follow-up of sleep apnea.  Patient was last seen 5/16/2022.  Patient continues to do well with CPAP therapy.  Patient is sleeping 8 hours nightly and does feel rested upon awakening.  Patient will go to sleep within 15 to 30 minutes and does get up x3.  Patient has an Hornsby score of 5/24.  Patient states she is doing well, has no concerns or complaints, and will continue therapy.        Current medications are:   Current Outpatient Medications:   •  allopurinol (ZYLOPRIM) 300 MG tablet, Take 150 mg by mouth Daily., Disp: , Rfl:   •  aspirin 81 MG EC tablet, Take 1 tablet by mouth Every Night., Disp: , Rfl:   •  docusate sodium (COLACE) 100 MG capsule, Take 1 capsule by mouth 2 (Two) Times a Day., Disp: , Rfl:   •  esomeprazole (nexIUM) 20 MG capsule, Take 1 capsule by mouth Every Morning Before Breakfast., Disp: , Rfl:   •  Lactobacillus Rhamnosus, GG, ( Probiotic Digestive Care) capsule, Take 1 capsule by mouth Daily., Disp: , Rfl:   •  losartan (COZAAR) 50 MG tablet, , Disp: , Rfl:   •  metoprolol succinate XL (TOPROL-XL) 25 MG 24 hr tablet, Take 1 tablet by mouth Daily., Disp: , Rfl:   •  Polyethylene Glycol 3350 (MIRALAX PO), Take  by mouth., Disp: , Rfl:   •  rosuvastatin (CRESTOR) 40 MG tablet, Take 1 tablet by mouth Daily., Disp: , Rfl:   •  Sodium Fluoride 5000 PPM 1.1 % paste, , Disp: , Rfl:   No current facility-administered medications for this visit.    Facility-Administered Medications Ordered in Other Visits:   •  acetaminophen (TYLENOL) tablet 1,000 mg, 1,000 mg, Oral, Once, Don Moore MD  •  gabapentin (NEURONTIN) capsule 600 mg, 600 mg, Oral, Once, Don Moore MD  •  meloxicam (MOBIC) tablet 15 mg, 15 mg, Oral, Once, Don Moore MD.      The patient's relevant past medical, surgical, family and social history were reviewed and updated in Epic  "as appropriate.       Review of Systems   HENT: Positive for hearing loss, postnasal drip and tinnitus.    Eyes: Positive for visual disturbance.   Respiratory: Positive for apnea, cough and wheezing.    Gastrointestinal: Positive for constipation.        Heartburn   Genitourinary: Positive for frequency.   Musculoskeletal: Positive for arthralgias and back pain.   Psychiatric/Behavioral: Positive for sleep disturbance.   All other systems reviewed and are negative.        Objective:    Physical Exam  Constitutional:       Appearance: Normal appearance.   HENT:      Head: Normocephalic and atraumatic.      Mouth/Throat:      Comments: Mallampati 3 anatomy  Cardiovascular:      Rate and Rhythm: Normal rate and regular rhythm.   Pulmonary:      Effort: Pulmonary effort is normal. No respiratory distress.      Breath sounds: Normal breath sounds.   Skin:     General: Skin is warm and dry.   Neurological:      Mental Status: She is alert.     /56 (BP Location: Left arm, Patient Position: Sitting)   Pulse 89   Ht 157.5 cm (62\")   Wt 61.8 kg (136 lb 3.2 oz)   SpO2 92%   BMI 24.91 kg/m²       CPAP Report  90/90 days of use  Greater than 4-hour use 100%  90% pressure 10.5  AHI 3.0  Settings 8-18    The patient continues to use and benefit from CPAP therapy.    ASSESSMENT/PLAN    Diagnoses and all orders for this visit:    1. ABHISHEK (obstructive sleep apnea) (Primary)  -     PAP Therapy        1. Counseled patient regarding multimodal approach with healthy nutrition, healthy sleep, regular physical activity, social activities, counseling, and medications. Encouraged to practice lateral sleep position. Avoid alcohol and sedatives close to bedtime.  2.     Refill supplies x1 year.  Return to clinic 1 year sooner symptoms warrant.  I have reviewed the results of my evaluation and impression and discussed my recommendations in detail with the patient.      Signed by  Kim Clark, APRN    May 15, 2023      CC: Ludwig, " Isabella MAYO MD         No ref. provider found

## 2023-08-02 ENCOUNTER — OFFICE VISIT (OUTPATIENT)
Dept: OBSTETRICS AND GYNECOLOGY | Facility: CLINIC | Age: 78
End: 2023-08-02
Payer: MEDICARE

## 2023-08-02 VITALS
SYSTOLIC BLOOD PRESSURE: 140 MMHG | HEIGHT: 62 IN | WEIGHT: 155 LBS | DIASTOLIC BLOOD PRESSURE: 66 MMHG | BODY MASS INDEX: 28.52 KG/M2

## 2023-08-02 DIAGNOSIS — N81.9 FEMALE GENITAL PROLAPSE, UNSPECIFIED TYPE: ICD-10-CM

## 2023-08-02 DIAGNOSIS — Z46.89 PESSARY MAINTENANCE: Primary | ICD-10-CM

## 2023-08-02 DIAGNOSIS — Z09 FOLLOW-UP EXAM: ICD-10-CM

## 2023-08-02 RX ORDER — MULTIVIT-MIN/IRON/FA/VIT K/LUT 4MG-200MCG
2 TABLET ORAL DAILY
COMMUNITY
Start: 2023-07-12

## 2023-08-02 RX ORDER — PNV NO.95/FERROUS FUM/FOLIC AC 28MG-0.8MG
325 TABLET ORAL
COMMUNITY
Start: 2023-07-12

## 2023-11-21 ENCOUNTER — OFFICE VISIT (OUTPATIENT)
Dept: OBSTETRICS AND GYNECOLOGY | Facility: CLINIC | Age: 78
End: 2023-11-21
Payer: MEDICARE

## 2023-11-21 VITALS
SYSTOLIC BLOOD PRESSURE: 110 MMHG | WEIGHT: 157.4 LBS | DIASTOLIC BLOOD PRESSURE: 64 MMHG | BODY MASS INDEX: 28.97 KG/M2 | HEIGHT: 62 IN

## 2023-11-21 DIAGNOSIS — N81.9 FEMALE GENITAL PROLAPSE, UNSPECIFIED TYPE: ICD-10-CM

## 2023-11-21 DIAGNOSIS — Z46.89 PESSARY MAINTENANCE: Primary | ICD-10-CM

## 2023-11-21 NOTE — PROGRESS NOTES
Chief Complaint  Jessica Durham is a 78 y.o.  female presenting for Pessary Check (#6 ring with support.)    History of Present Illness  Jessica is a pleasant 79yo woman, , here for pessary maintenance.  Her surgical hx includes, in part, a hysterectomy & BSO.  She has had no bleeding since last pessary care.  Denies any gyn problems.    The following portions of the patient's history were reviewed and updated as appropriate: allergies, current medications, past family history, past medical history, past social history, past surgical history, and problem list.    No Known Allergies      Current Outpatient Medications:     allopurinol (ZYLOPRIM) 300 MG tablet, Take 150 mg by mouth Daily., Disp: , Rfl:     aspirin 81 MG EC tablet, Take 1 tablet by mouth Every Night., Disp: , Rfl:     docusate sodium (COLACE) 100 MG capsule, Take 1 capsule by mouth 2 (Two) Times a Day., Disp: , Rfl:     esomeprazole (nexIUM) 20 MG capsule, Take 1 capsule by mouth Every Morning Before Breakfast., Disp: , Rfl:     ferrous sulfate 325 (65 Fe) MG tablet, Take 1 tablet by mouth Daily With Breakfast., Disp: , Rfl:     Lactobacillus Rhamnosus, GG, ( Probiotic Digestive Care) capsule, Take 1 capsule by mouth Daily., Disp: , Rfl:     losartan (COZAAR) 50 MG tablet, , Disp: , Rfl:     metoprolol succinate XL (TOPROL-XL) 25 MG 24 hr tablet, Take 1 tablet by mouth Daily., Disp: , Rfl:     Multiple Vitamins-Minerals (Centrum Minis Women 50+) tablet, Take 2 tablets by mouth Daily., Disp: , Rfl:     Polyethylene Glycol 3350 (MIRALAX PO), Take  by mouth., Disp: , Rfl:     rosuvastatin (CRESTOR) 40 MG tablet, Take 1 tablet by mouth Daily., Disp: , Rfl:   No current facility-administered medications for this visit.    Facility-Administered Medications Ordered in Other Visits:     acetaminophen (TYLENOL) tablet 1,000 mg, 1,000 mg, Oral, Once, Don Moore MD    gabapentin (NEURONTIN) capsule 600 mg, 600 mg, Oral, Once, Don Moroe  "MD    meloxicam (MOBIC) tablet 15 mg, 15 mg, Oral, Once, Don Moore MD    Past Medical History:   Diagnosis Date    Acute pancreatitis due to infection     gall stone blocking bile duct    Arthritis     Gout     Hyperlipidemia     Hypertension     Incisional hernia     Osteoporosis     Parastomal hernia     Pulmonary embolism     Rectal prolapse     Rectocele         Past Surgical History:   Procedure Laterality Date    APPENDECTOMY      BLADDER REPAIR      COLON RESECTION N/A 11/04/2019    Procedure: PROCTOSIGMOID COLECTOMY, COLOSTOMY, APPENDECTOMY, REMOVAL OF PESSARY, VAGINOPEXY WITH INTRENSIC BRIDGE, UMBILICAL HERNIA REPAIR, AND OMENTUM FLAP;  Surgeon: Don Moore MD;  Location: [facility];  Service: General    COLONOSCOPY      EYE SURGERY      bilateral lens    LAPAROSCOPIC CHOLECYSTECTOMY      TOTAL SHOULDER ARTHROPLASTY Right 03/02/2022    VAGINAL HYSTERECTOMY SALPINGO OOPHORECTOMY Bilateral        Objective  /64   Ht 157.5 cm (62\")   Wt 71.4 kg (157 lb 6.4 oz)   Breastfeeding No   BMI 28.79 kg/m²     Physical Exam  Vitals and nursing note reviewed. Exam conducted with a chaperone present.   Constitutional:       Appearance: Normal appearance.   Abdominal:      General: Bowel sounds are normal.      Palpations: Abdomen is soft. There is no mass.      Tenderness: There is no abdominal tenderness.   Genitourinary:     General: Normal vulva.      Labia:         Right: No rash, tenderness or lesion.         Left: No rash, tenderness or lesion.       Vagina: Normal. No erythema.      Uterus: Absent.       Adnexa:         Right: No mass or tenderness.          Left: No mass or tenderness.        Comments: Denuded area with some granulation tissue on the left fornix; silver nitrate used.  Pessary was cleaned with Hibiclens, then gently replaced.  She tolerated well.  Anus appears wnl.  (No rectal exam performed.)  Neurological:      Mental Status: She is oriented to person, place, and time. "   Psychiatric:         Mood and Affect: Mood normal.         Behavior: Behavior normal.         Assessment/Plan   Diagnoses and all orders for this visit:    1. Pessary maintenance (Primary)    2. Female genital prolapse, unspecified type      FU in 3 mo  Procedures            No follow-ups on file.    Gia Alanis, APRN  11/21/2023

## 2024-02-21 ENCOUNTER — OFFICE VISIT (OUTPATIENT)
Dept: OBSTETRICS AND GYNECOLOGY | Facility: CLINIC | Age: 79
End: 2024-02-21
Payer: MEDICARE

## 2024-02-21 VITALS
DIASTOLIC BLOOD PRESSURE: 72 MMHG | SYSTOLIC BLOOD PRESSURE: 128 MMHG | HEIGHT: 62 IN | BODY MASS INDEX: 29.08 KG/M2 | WEIGHT: 158 LBS

## 2024-02-21 DIAGNOSIS — N81.9 FEMALE GENITAL PROLAPSE, UNSPECIFIED TYPE: ICD-10-CM

## 2024-02-21 DIAGNOSIS — Z46.89 ENCOUNTER FOR PESSARY MAINTENANCE: Primary | ICD-10-CM

## 2024-03-01 NOTE — PROGRESS NOTES
Chief Complaint  Jessica Durham is a 79 y.o.  female presenting for Pessary Check (#6 ring with support.)    History of Present Illness  Jessica is a pleasant 78yo woman, , here for pessary care.  She has a past surgical history of hysterectomy with BSO.  She feels the pessary is helpful in reducing pelvic pressure and discomfort from prolapsed vaginal walls.  Denies any vaginal bleeding or s/sx vaginitis.  No dysuria.      The following portions of the patient's history were reviewed and updated as appropriate: allergies, current medications, past family history, past medical history, past social history, past surgical history, and problem list.    No Known Allergies      Current Outpatient Medications:     allopurinol (ZYLOPRIM) 300 MG tablet, Take 150 mg by mouth Daily., Disp: , Rfl:     aspirin 81 MG EC tablet, Take 1 tablet by mouth Every Night., Disp: , Rfl:     docusate sodium (COLACE) 100 MG capsule, Take 1 capsule by mouth 2 (Two) Times a Day., Disp: , Rfl:     esomeprazole (nexIUM) 20 MG capsule, Take 1 capsule by mouth Every Morning Before Breakfast., Disp: , Rfl:     ferrous sulfate 325 (65 Fe) MG tablet, Take 1 tablet by mouth Daily With Breakfast., Disp: , Rfl:     Lactobacillus Rhamnosus, GG, ( Probiotic Digestive Care) capsule, Take 1 capsule by mouth Daily., Disp: , Rfl:     losartan (COZAAR) 50 MG tablet, , Disp: , Rfl:     metoprolol succinate XL (TOPROL-XL) 25 MG 24 hr tablet, Take 1 tablet by mouth Daily., Disp: , Rfl:     Multiple Vitamins-Minerals (Centrum Minis Women 50+) tablet, Take 2 tablets by mouth Daily., Disp: , Rfl:     Polyethylene Glycol 3350 (MIRALAX PO), Take  by mouth., Disp: , Rfl:     rosuvastatin (CRESTOR) 40 MG tablet, Take 1 tablet by mouth Daily., Disp: , Rfl:   No current facility-administered medications for this visit.    Facility-Administered Medications Ordered in Other Visits:     acetaminophen (TYLENOL) tablet 1,000 mg, 1,000 mg, Oral, Once, Svetich,  "Don MARQUEZ MD    gabapentin (NEURONTIN) capsule 600 mg, 600 mg, Oral, Once, Don Moore MD    meloxicam (MOBIC) tablet 15 mg, 15 mg, Oral, Once, Don Moore MD    Past Medical History:   Diagnosis Date    Acute pancreatitis due to infection     gall stone blocking bile duct    Arthritis     Gout     Hyperlipidemia     Hypertension     Incisional hernia     Osteoporosis     Parastomal hernia     Pulmonary embolism     Rectal prolapse     Rectocele         Past Surgical History:   Procedure Laterality Date    APPENDECTOMY      BLADDER REPAIR      COLON RESECTION N/A 11/04/2019    Procedure: PROCTOSIGMOID COLECTOMY, COLOSTOMY, APPENDECTOMY, REMOVAL OF PESSARY, VAGINOPEXY WITH INTRENSIC BRIDGE, UMBILICAL HERNIA REPAIR, AND OMENTUM FLAP;  Surgeon: Don Moore MD;  Location: [facility];  Service: General    COLONOSCOPY      EYE SURGERY      bilateral lens    LAPAROSCOPIC CHOLECYSTECTOMY      TOTAL SHOULDER ARTHROPLASTY Right 03/02/2022    VAGINAL HYSTERECTOMY SALPINGO OOPHORECTOMY Bilateral        Objective  /72   Ht 157.5 cm (62\")   Wt 71.7 kg (158 lb)   Breastfeeding No   BMI 28.90 kg/m²     Physical Exam  Vitals and nursing note reviewed. Exam conducted with a chaperone present.   Constitutional:       Appearance: Normal appearance.   Abdominal:      General: Bowel sounds are normal.      Palpations: Abdomen is soft. There is no mass.      Tenderness: There is no abdominal tenderness.   Genitourinary:     General: Normal vulva.      Labia:         Right: No rash, tenderness or lesion.         Left: No rash, tenderness or lesion.       Vagina: Normal. No vaginal discharge or erythema.      Uterus: Absent.       Adnexa:         Right: No mass or tenderness.          Left: No mass or tenderness.        Comments: No denuded areas or abnormal vaginal discharge.  She did fissure at the 6:00 position (small & superficial) after removing the pessary.  Quick hemostasis with 4x4 gauze pads.  Anus appears " wnl.  (No rectal exam performed.)  Skin:     General: Skin is warm and dry.   Neurological:      Mental Status: She is oriented to person, place, and time.   Psychiatric:         Mood and Affect: Mood normal.         Behavior: Behavior normal.         Assessment/Plan   Diagnoses and all orders for this visit:    1. Encounter for pessary maintenance (Primary)    2. Female genital prolapse, unspecified type    Procedures            Return in about 3 months (around 5/21/2024) for Recheck / pessary care.    Gia Alanis, KRISTIAN  02/21/2024

## 2024-05-23 ENCOUNTER — OFFICE VISIT (OUTPATIENT)
Dept: OBSTETRICS AND GYNECOLOGY | Facility: CLINIC | Age: 79
End: 2024-05-23
Payer: MEDICARE

## 2024-05-23 VITALS
BODY MASS INDEX: 28.85 KG/M2 | SYSTOLIC BLOOD PRESSURE: 110 MMHG | HEIGHT: 62 IN | WEIGHT: 156.8 LBS | DIASTOLIC BLOOD PRESSURE: 78 MMHG

## 2024-05-23 DIAGNOSIS — Z46.89 PESSARY MAINTENANCE: Primary | ICD-10-CM

## 2024-05-23 DIAGNOSIS — N81.9 FEMALE GENITAL PROLAPSE, UNSPECIFIED TYPE: ICD-10-CM

## 2024-05-23 RX ORDER — GENTAMICIN SULFATE 1 MG/G
1 OINTMENT TOPICAL AS NEEDED
COMMUNITY
Start: 2024-03-28

## 2024-05-23 NOTE — PROGRESS NOTES
Chief Complaint  Jessica Durham is a 79 y.o.  female presenting for Pessary Check (#6 ring with support.)    History of Present Illness  Jessica is an 80yo woman, , here for pessary maintenance.    Her PSHx includes vaginal hysterectomy with BSO.  She denies any vaginitis symptoms, pain/ pressure, or any bleeding.  ROS stable.  No hosp or surgeries since LOV.    The following portions of the patient's history were reviewed and updated as appropriate: allergies, current medications, past family history, past medical history, past social history, past surgical history, and problem list.    No Known Allergies      Current Outpatient Medications:     gentamicin (GARAMYCIN) 0.1 % ointment, Apply 1 Application topically to the appropriate area as directed As Needed., Disp: , Rfl:     allopurinol (ZYLOPRIM) 300 MG tablet, Take 150 mg by mouth Daily., Disp: , Rfl:     aspirin 81 MG EC tablet, Take 1 tablet by mouth Every Night., Disp: , Rfl:     docusate sodium (COLACE) 100 MG capsule, Take 1 capsule by mouth 2 (Two) Times a Day., Disp: , Rfl:     esomeprazole (nexIUM) 20 MG capsule, Take 1 capsule by mouth Every Morning Before Breakfast., Disp: , Rfl:     ferrous sulfate 325 (65 Fe) MG tablet, Take 1 tablet by mouth Daily With Breakfast., Disp: , Rfl:     Lactobacillus Rhamnosus, GG, ( Probiotic Digestive Care) capsule, Take 1 capsule by mouth Daily., Disp: , Rfl:     losartan (COZAAR) 50 MG tablet, , Disp: , Rfl:     metoprolol succinate XL (TOPROL-XL) 25 MG 24 hr tablet, Take 1 tablet by mouth Daily., Disp: , Rfl:     Multiple Vitamins-Minerals (Centrum Minis Women 50+) tablet, Take 2 tablets by mouth Daily., Disp: , Rfl:     Polyethylene Glycol 3350 (MIRALAX PO), Take  by mouth., Disp: , Rfl:     rosuvastatin (CRESTOR) 40 MG tablet, Take 1 tablet by mouth Daily., Disp: , Rfl:   No current facility-administered medications for this visit.    Facility-Administered Medications Ordered in Other Visits:      "acetaminophen (TYLENOL) tablet 1,000 mg, 1,000 mg, Oral, Once, Don Moore MD    gabapentin (NEURONTIN) capsule 600 mg, 600 mg, Oral, Once, Don Moore MD    meloxicam (MOBIC) tablet 15 mg, 15 mg, Oral, Once, Don Moore MD    Past Medical History:   Diagnosis Date    Acute pancreatitis due to infection     gall stone blocking bile duct    Arthritis     Gout     Hyperlipidemia     Hypertension     Incisional hernia     Osteoporosis     Parastomal hernia     Pulmonary embolism     Rectal prolapse     Rectocele         Past Surgical History:   Procedure Laterality Date    APPENDECTOMY      BLADDER REPAIR      COLON RESECTION N/A 11/04/2019    Procedure: PROCTOSIGMOID COLECTOMY, COLOSTOMY, APPENDECTOMY, REMOVAL OF PESSARY, VAGINOPEXY WITH INTRENSIC BRIDGE, UMBILICAL HERNIA REPAIR, AND OMENTUM FLAP;  Surgeon: Don Moore MD;  Location: [facility];  Service: General    COLONOSCOPY      EYE SURGERY      bilateral lens    LAPAROSCOPIC CHOLECYSTECTOMY      TOTAL SHOULDER ARTHROPLASTY Right 03/02/2022    VAGINAL HYSTERECTOMY SALPINGO OOPHORECTOMY Bilateral        Objective  /78   Ht 157.5 cm (62\")   Wt 71.1 kg (156 lb 12.8 oz)   Breastfeeding No   BMI 28.68 kg/m²     Physical Exam  Exam conducted with a chaperone present.   Constitutional:       Appearance: Normal appearance.   Abdominal:      General: Bowel sounds are normal.      Palpations: Abdomen is soft. There is no mass.      Tenderness: There is no abdominal tenderness.   Genitourinary:     General: Normal vulva.      Labia:         Right: No rash, tenderness or lesion.         Left: No rash, tenderness or lesion.       Vagina: Normal. No erythema.      Uterus: Absent.       Adnexa:         Right: No mass or tenderness.          Left: No mass or tenderness.        Comments: Small denuded areas in the apex;  silver nitrate used on them.  She did have scant bld at 6:00 position of introitus after the pessary was removed for cleaning.  " Pessary was gently replaced after cleaning.  She tolerated well.  Anus appears wnl.  (No rectal exam performed.)        Assessment/Plan   Diagnoses and all orders for this visit:    1. Pessary maintenance (Primary)    2. Female genital prolapse, unspecified type        Procedures            Return in about 3 months (around 8/23/2024) for Recheck and pessary care.    Gia Alanis, APRN  05/23/2024

## 2024-09-06 ENCOUNTER — OFFICE VISIT (OUTPATIENT)
Dept: OBSTETRICS AND GYNECOLOGY | Facility: CLINIC | Age: 79
End: 2024-09-06
Payer: MEDICARE

## 2024-09-06 VITALS
DIASTOLIC BLOOD PRESSURE: 70 MMHG | HEIGHT: 62 IN | SYSTOLIC BLOOD PRESSURE: 138 MMHG | WEIGHT: 157 LBS | BODY MASS INDEX: 28.89 KG/M2

## 2024-09-06 DIAGNOSIS — R23.8 DENUDED SKIN: ICD-10-CM

## 2024-09-06 DIAGNOSIS — Z46.89 PESSARY MAINTENANCE: ICD-10-CM

## 2024-09-06 DIAGNOSIS — N81.9 FEMALE GENITAL PROLAPSE, UNSPECIFIED TYPE: Primary | ICD-10-CM

## 2024-09-06 PROCEDURE — 99213 OFFICE O/P EST LOW 20 MIN: CPT | Performed by: NURSE PRACTITIONER

## 2024-09-06 PROCEDURE — 3078F DIAST BP <80 MM HG: CPT | Performed by: NURSE PRACTITIONER

## 2024-09-06 PROCEDURE — 3075F SYST BP GE 130 - 139MM HG: CPT | Performed by: NURSE PRACTITIONER

## 2024-09-06 PROCEDURE — 1160F RVW MEDS BY RX/DR IN RCRD: CPT | Performed by: NURSE PRACTITIONER

## 2024-09-06 PROCEDURE — 1159F MED LIST DOCD IN RCRD: CPT | Performed by: NURSE PRACTITIONER

## 2024-10-03 ENCOUNTER — TELEPHONE (OUTPATIENT)
Dept: OBSTETRICS AND GYNECOLOGY | Facility: CLINIC | Age: 79
End: 2024-10-03

## 2024-10-03 NOTE — TELEPHONE ENCOUNTER
Caller: Jessica Durham    Relationship: Self    Best call back number: [phone]      Who are you requesting to speak with (clinical staff, CHELSI      What was the call regarding: PATIENT IS REQUESTING THAT CHELSI,  GIVE HER A CALL. PLEASE ASSIST.

## 2024-12-05 ENCOUNTER — OFFICE VISIT (OUTPATIENT)
Dept: OBSTETRICS AND GYNECOLOGY | Facility: CLINIC | Age: 79
End: 2024-12-05
Payer: MEDICARE

## 2024-12-05 VITALS
SYSTOLIC BLOOD PRESSURE: 108 MMHG | HEIGHT: 62 IN | DIASTOLIC BLOOD PRESSURE: 72 MMHG | WEIGHT: 156.2 LBS | BODY MASS INDEX: 28.74 KG/M2

## 2024-12-05 DIAGNOSIS — N39.490 OVERFLOW INCONTINENCE OF URINE: ICD-10-CM

## 2024-12-05 DIAGNOSIS — Z46.89 PESSARY MAINTENANCE: Primary | ICD-10-CM

## 2024-12-05 RX ORDER — B.COAGUL,SUBTILIS/INULIN/VIT C 1B CELL-1G
TABLET,CHEWABLE ORAL
COMMUNITY
End: 2024-12-05

## 2024-12-05 NOTE — PROGRESS NOTES
"Subjective   Chief Complaint   Patient presents with    Follow-up     Pessary maintenance     Jessica Durham is a 79 y.o. year old  menopausal female presenting to be seen for pessary maintenance. She is happy with pessary. Has had some occasional spotting with this but no bothersome bleeding. She does not use estrogen cream due to history of PE in the past, but does use Replens. Has a history of TVH BSO.  No complaints.     She does have occasional urinary incontinence when she is very full, and does wear a poise pad but this is not bothersome to her.     Review of systems otherwise negative       Objective   /72   Ht 157.5 cm (62.01\")   Wt 70.9 kg (156 lb 3.2 oz)   BMI 28.56 kg/m²     General:  well developed; well nourished  no acute distress  mentation appropriate   Skin:  No suspicious lesions seen   Thyroid: not examined   Breasts:  Not performed.   Abdomen: soft, non-tender; no masses   Pelvis: Clinical staff was present for exam  Normal external female genitalia. #6 ring without support removed. Small amount of vaginal bleeding at introitus at 6 o'clock position from removal of pessary -- stopped with gauze quickly. SSE demonstrated atrophic tissues but no areas of bleeding or denuded tissue. Vaginal vault intact. Pessary cleansed and replaced without problems        Assessment      Plan   Pessary maintenance   - Pessary removed, cleansed and replaced without problems  - Continue pessary with follow up in 3 months     GYN maintenance   - Pap smear: s/p TVH BSO   - Mammogram:  over 73    - Urinary incontinence: occasional overflow, but not bothersome    - Patient has PCP     Follow up in 3 months     No orders of the defined types were placed in this encounter.         This note was electronically signed.      Chiquis Eddy MD  Obstetrics and Gynecology  [facility]     "

## 2025-03-07 ENCOUNTER — OFFICE VISIT (OUTPATIENT)
Dept: OBSTETRICS AND GYNECOLOGY | Facility: CLINIC | Age: 80
End: 2025-03-07
Payer: MEDICARE

## 2025-03-07 VITALS
WEIGHT: 151.2 LBS | HEIGHT: 62 IN | BODY MASS INDEX: 27.82 KG/M2 | DIASTOLIC BLOOD PRESSURE: 72 MMHG | SYSTOLIC BLOOD PRESSURE: 114 MMHG

## 2025-03-07 DIAGNOSIS — Z46.89 PESSARY MAINTENANCE: Primary | ICD-10-CM

## 2025-03-07 NOTE — PROGRESS NOTES
"Subjective   Chief Complaint   Patient presents with    Follow-up     Pessary Maintenance  / light bleeding off and on (will only last a day or two at a time)     Jessica Durham is a 80 y.o. year old  menopausal female presenting to be seen for pessary maintenance. She is happy with pessary. Has had some slight increase in spotting this month and feels a little more irritated. No bothersome bleeding. She does not use estrogen cream due to history of PE in the past, but does use Replens. Has a history of TVH BSO.  No complaints.     Medical history significant for HTN. CKD, osteoporosis (no meds due to CKD), hx colostomy for rectocele and incontinence.     She does have occasional urinary incontinence when she is very full, and does wear a poise pad but this is not bothersome to her. She does not want to make any changes on this right now.     Review of systems otherwise negative       Objective   /72   Ht 157.5 cm (62.01\")   Wt 68.6 kg (151 lb 3.2 oz)   BMI 27.65 kg/m²     General:  well developed; well nourished  no acute distress  mentation appropriate   Skin:  No suspicious lesions seen   Thyroid: not examined   Breasts:  Not performed.   Abdomen: soft, non-tender; no masses. + colostomy bag    Pelvis: Clinical staff was present for exam  Normal external female genitalia. #6 ring without support removed. Small amount of vaginal bleeding at introitus at 6 o'clock position from removal of pessary. SSE demonstrated atrophic tissues with one area of denuded tissue on right vaginal wall -- silver nitrate applied. Vaginal vault intact. Pessary cleansed and replaced without problems        Assessment      Plan   Pessary maintenance   - Pessary removed, cleansed and replaced  - Silver nitrate applied to denuded area. Recommend continued Replens   - Continue pessary with follow up in 1 month due to area of denuded tissue   - Has history of PE so avoids any estrogen        Follow up in 1 month     No " orders of the defined types were placed in this encounter.         This note was electronically signed.      Chiquis Eddy MD  Obstetrics and Gynecology  [facility]

## 2025-04-04 ENCOUNTER — OFFICE VISIT (OUTPATIENT)
Dept: OBSTETRICS AND GYNECOLOGY | Facility: CLINIC | Age: 80
End: 2025-04-04
Payer: MEDICARE

## 2025-04-04 VITALS
BODY MASS INDEX: 28.23 KG/M2 | HEIGHT: 62 IN | WEIGHT: 153.4 LBS | SYSTOLIC BLOOD PRESSURE: 118 MMHG | DIASTOLIC BLOOD PRESSURE: 76 MMHG

## 2025-04-04 DIAGNOSIS — Z46.89 PESSARY MAINTENANCE: Primary | ICD-10-CM

## 2025-04-04 NOTE — PROGRESS NOTES
"Subjective   Chief Complaint   Patient presents with    Follow-up     Pessary Maintenance     Jessica Durham is a 80 y.o. year old  menopausal female presenting to be seen for pessary maintenance. She is happy with pessary. Increased use of Replens and this has helped with irritation. Not had any bleeding since last visit.  She does not use estrogen cream due to history of PE in the past, but does use Replens. Has a history of TVH BSO.  No complaints.     Medical history significant for HTN. CKD, osteoporosis (no meds due to CKD), hx colostomy for rectocele and incontinence.     Review of systems otherwise negative       Objective   /76   Ht 157.5 cm (62.01\")   Wt 69.6 kg (153 lb 6.4 oz)   BMI 28.05 kg/m²     General:  well developed; well nourished  no acute distress  mentation appropriate   Skin:  No suspicious lesions seen   Thyroid: not examined   Breasts:  Not performed.   Abdomen: soft, non-tender; no masses.   Pelvis: Clinical staff was present for exam  Normal external female genitalia. #6 ring without support removed. SSE demonstrated atrophic tissues with one area of mildly erythematous tissue on right vaginal wall. No bleeding. Silver nitrate applied. Vaginal vault intact. Pessary cleansed and replaced without problems        Assessment      Plan   Pessary maintenance   - Pessary removed, cleansed and replaced  - Silver nitrate applied. Recommend continued Replens   - Has history of PE so avoids any estrogen        Follow up in 3 months    No orders of the defined types were placed in this encounter.         This note was electronically signed.      Chiquis Eddy MD  Obstetrics and Gynecology  [facility]     "

## 2025-07-08 ENCOUNTER — OFFICE VISIT (OUTPATIENT)
Dept: OBSTETRICS AND GYNECOLOGY | Facility: CLINIC | Age: 80
End: 2025-07-08
Payer: MEDICARE

## 2025-07-08 VITALS
HEIGHT: 62 IN | BODY MASS INDEX: 27.05 KG/M2 | SYSTOLIC BLOOD PRESSURE: 118 MMHG | DIASTOLIC BLOOD PRESSURE: 68 MMHG | WEIGHT: 147 LBS

## 2025-07-08 DIAGNOSIS — Z46.89 PESSARY MAINTENANCE: Primary | ICD-10-CM

## 2025-07-08 RX ORDER — IRON POLYSACCHARIDE COMPLEX 150 MG
CAPSULE ORAL DAILY
COMMUNITY

## 2025-07-08 RX ORDER — SIMVASTATIN 20 MG
TABLET ORAL
COMMUNITY

## 2025-07-08 NOTE — PROGRESS NOTES
"Subjective   Chief Complaint   Patient presents with    Follow-up     Pessary Maintenance     Jessica Durham is a 80 y.o. year old  menopausal female presenting to be seen for pessary maintenance. She is not very happy with her pessary. Feels like it is overall bothersome and having a lot of irritation with this. She has increased use of Replens and thinks it has helped a little bit, but still finds intermittent bleeding to be bothersome.      She does not use estrogen cream due to history of PE in the past. Has a history of TVH BSO.   Medical history significant for HTN. CKD, osteoporosis (no meds due to CKD), hx colostomy for rectocele and incontinence.        Objective   /68   Ht 157.5 cm (62.01\")   Wt 66.7 kg (147 lb)   BMI 26.88 kg/m²     General:  well developed; well nourished  no acute distress  mentation appropriate   Skin:  No suspicious lesions seen   Thyroid: not examined   Breasts:  Not performed.   Abdomen: soft, non-tender; no masses.   Pelvis: Clinical staff was present for exam  Normal external female genitalia. #6 ring without support removed. SSE demonstrated atrophic tissues with one area of erythematous tissue on right vaginal wall. No bleeding. Pessary left out         Assessment      Plan   Pessary maintenance   - Pessary removed, cleansed and given to patient.   - Will trial 3-4 weeks without pessary (pessary break) to see if this helps with vaginal irritation and healing. Discussed that if prolapse symptoms are too bothersome during this time, we can replace earlier.   -Recommend continued Replens. Has history of PE so avoids any estrogen     Follow up in 3-4 weeks    No orders of the defined types were placed in this encounter.         This note was electronically signed.      Chiquis Eddy MD  Obstetrics and Gynecology  [facility]     "

## 2025-08-22 ENCOUNTER — OFFICE VISIT (OUTPATIENT)
Dept: OBSTETRICS AND GYNECOLOGY | Facility: CLINIC | Age: 80
End: 2025-08-22
Payer: MEDICARE

## 2025-08-22 VITALS
WEIGHT: 151.8 LBS | HEIGHT: 62 IN | BODY MASS INDEX: 27.94 KG/M2 | SYSTOLIC BLOOD PRESSURE: 106 MMHG | DIASTOLIC BLOOD PRESSURE: 64 MMHG

## 2025-08-22 DIAGNOSIS — N81.11 MIDLINE CYSTOCELE: Primary | ICD-10-CM

## (undated) DEVICE — TOWEL,OR,DSP,ST,BLUE,STD,4/PK,20PK/CS: Brand: MEDLINE

## (undated) DEVICE — INTENDED FOR TISSUE SEPARATION, AND OTHER PROCEDURES THAT REQUIRE A SHARP SURGICAL BLADE TO PUNCTURE OR CUT.: Brand: BARD-PARKER ® STAINLESS STEEL BLADES

## (undated) DEVICE — TRY SKINPREP DRYPREP

## (undated) DEVICE — COVER,LIGHT HANDLE,FLX,1/PK: Brand: MEDLINE INDUSTRIES, INC.

## (undated) DEVICE — JELLY,LUBE,STERILE,FLIP TOP,TUBE,2-OZ: Brand: MEDLINE

## (undated) DEVICE — COVER,MAYO STAND,XL,STERILE: Brand: MEDLINE

## (undated) DEVICE — SUT PROLN 2/0 PC3 8833H

## (undated) DEVICE — GLV SURG SENSICARE MICRO PF LF 8.5 STRL

## (undated) DEVICE — SUT PROLN 0 CT2 MONO 30IN 8412H

## (undated) DEVICE — MEDI-VAC YANKAUER SUCTION HANDLE: Brand: CARDINAL HEALTH

## (undated) DEVICE — CLTH CLENS READYCLEANSE PERI CARE PK/5

## (undated) DEVICE — SPNG LAP PREWSH SFTPK 18X18IN STRL PK/5

## (undated) DEVICE — TUBING, SUCTION, 1/4" X 10', STRAIGHT: Brand: MEDLINE

## (undated) DEVICE — SUT PDS 1 CTX 36IN VIO PDP371T

## (undated) DEVICE — DRAPE, LAVH, STERILE: Brand: MEDLINE

## (undated) DEVICE — LEX BASIC NO DRAPE: Brand: MEDLINE INDUSTRIES, INC.

## (undated) DEVICE — GOWN,NON-REINFORCED,SIRUS,SET IN SLV,XL: Brand: MEDLINE

## (undated) DEVICE — ANTIBACTERIAL UNDYED BRAIDED (POLYGLACTIN 910), SYNTHETIC ABSORBABLE SUTURE: Brand: COATED VICRYL

## (undated) DEVICE — DRSNG WND BORDR/ADHS NONADHR/GZ LF 4X10IN STRL

## (undated) DEVICE — SUT VIC 12X27 D8116 BX/12

## (undated) DEVICE — PENCL E/S HNDSWCH ROCKRBTN HOLSTR 10FT